# Patient Record
Sex: FEMALE | Race: WHITE | NOT HISPANIC OR LATINO | Employment: OTHER | URBAN - METROPOLITAN AREA
[De-identification: names, ages, dates, MRNs, and addresses within clinical notes are randomized per-mention and may not be internally consistent; named-entity substitution may affect disease eponyms.]

---

## 2017-03-24 ENCOUNTER — APPOINTMENT (OUTPATIENT)
Dept: RADIOLOGY | Facility: MEDICAL CENTER | Age: 62
End: 2017-03-24
Attending: EMERGENCY MEDICINE
Payer: COMMERCIAL

## 2017-03-24 ENCOUNTER — HOSPITAL ENCOUNTER (EMERGENCY)
Facility: MEDICAL CENTER | Age: 62
End: 2017-03-24
Attending: EMERGENCY MEDICINE
Payer: COMMERCIAL

## 2017-03-24 ENCOUNTER — HOSPITAL ENCOUNTER (OUTPATIENT)
Dept: RADIOLOGY | Facility: MEDICAL CENTER | Age: 62
End: 2017-03-24

## 2017-03-24 VITALS
OXYGEN SATURATION: 96 % | TEMPERATURE: 98.6 F | HEART RATE: 72 BPM | HEIGHT: 63 IN | BODY MASS INDEX: 22.15 KG/M2 | SYSTOLIC BLOOD PRESSURE: 148 MMHG | RESPIRATION RATE: 9 BRPM | DIASTOLIC BLOOD PRESSURE: 88 MMHG | WEIGHT: 125 LBS

## 2017-03-24 DIAGNOSIS — R07.89 OTHER CHEST PAIN: ICD-10-CM

## 2017-03-24 LAB
ALBUMIN SERPL BCP-MCNC: 3.9 G/DL (ref 3.2–4.9)
ALBUMIN/GLOB SERPL: 1.4 G/DL
ALP SERPL-CCNC: 71 U/L (ref 30–99)
ALT SERPL-CCNC: 10 U/L (ref 2–50)
ANION GAP SERPL CALC-SCNC: 10 MMOL/L (ref 0–11.9)
AST SERPL-CCNC: 17 U/L (ref 12–45)
BASOPHILS # BLD AUTO: 0.2 % (ref 0–1.8)
BASOPHILS # BLD: 0.02 K/UL (ref 0–0.12)
BILIRUB SERPL-MCNC: 0.8 MG/DL (ref 0.1–1.5)
BUN SERPL-MCNC: 12 MG/DL (ref 8–22)
CALCIUM SERPL-MCNC: 9.1 MG/DL (ref 8.5–10.5)
CHLORIDE SERPL-SCNC: 107 MMOL/L (ref 96–112)
CO2 SERPL-SCNC: 23 MMOL/L (ref 20–33)
CREAT SERPL-MCNC: 0.58 MG/DL (ref 0.5–1.4)
EKG IMPRESSION: NORMAL
EOSINOPHIL # BLD AUTO: 0.04 K/UL (ref 0–0.51)
EOSINOPHIL NFR BLD: 0.4 % (ref 0–6.9)
ERYTHROCYTE [DISTWIDTH] IN BLOOD BY AUTOMATED COUNT: 41.2 FL (ref 35.9–50)
GFR SERPL CREATININE-BSD FRML MDRD: >60 ML/MIN/1.73 M 2
GLOBULIN SER CALC-MCNC: 2.7 G/DL (ref 1.9–3.5)
GLUCOSE SERPL-MCNC: 74 MG/DL (ref 65–99)
HCT VFR BLD AUTO: 40.4 % (ref 37–47)
HGB BLD-MCNC: 14.1 G/DL (ref 12–16)
IMM GRANULOCYTES # BLD AUTO: 0.02 K/UL (ref 0–0.11)
IMM GRANULOCYTES NFR BLD AUTO: 0.2 % (ref 0–0.9)
LYMPHOCYTES # BLD AUTO: 1.38 K/UL (ref 1–4.8)
LYMPHOCYTES NFR BLD: 15.2 % (ref 22–41)
MCH RBC QN AUTO: 32.6 PG (ref 27–33)
MCHC RBC AUTO-ENTMCNC: 34.9 G/DL (ref 33.6–35)
MCV RBC AUTO: 93.5 FL (ref 81.4–97.8)
MONOCYTES # BLD AUTO: 0.6 K/UL (ref 0–0.85)
MONOCYTES NFR BLD AUTO: 6.6 % (ref 0–13.4)
NEUTROPHILS # BLD AUTO: 7.02 K/UL (ref 2–7.15)
NEUTROPHILS NFR BLD: 77.4 % (ref 44–72)
NRBC # BLD AUTO: 0 K/UL
NRBC BLD AUTO-RTO: 0 /100 WBC
PLATELET # BLD AUTO: 276 K/UL (ref 164–446)
PMV BLD AUTO: 9.4 FL (ref 9–12.9)
POTASSIUM SERPL-SCNC: 4 MMOL/L (ref 3.6–5.5)
PROT SERPL-MCNC: 6.6 G/DL (ref 6–8.2)
RBC # BLD AUTO: 4.32 M/UL (ref 4.2–5.4)
SODIUM SERPL-SCNC: 140 MMOL/L (ref 135–145)
TROPONIN I SERPL-MCNC: <0.01 NG/ML (ref 0–0.04)
WBC # BLD AUTO: 9.1 K/UL (ref 4.8–10.8)

## 2017-03-24 PROCEDURE — 80053 COMPREHEN METABOLIC PANEL: CPT

## 2017-03-24 PROCEDURE — 93005 ELECTROCARDIOGRAM TRACING: CPT

## 2017-03-24 PROCEDURE — 99284 EMERGENCY DEPT VISIT MOD MDM: CPT

## 2017-03-24 PROCEDURE — 85025 COMPLETE CBC W/AUTO DIFF WBC: CPT

## 2017-03-24 PROCEDURE — 84484 ASSAY OF TROPONIN QUANT: CPT

## 2017-03-24 ASSESSMENT — PAIN SCALES - GENERAL: PAINLEVEL_OUTOF10: 0

## 2017-03-24 ASSESSMENT — LIFESTYLE VARIABLES: DO YOU DRINK ALCOHOL: NO

## 2017-03-24 NOTE — ED AVS SNAPSHOT
Home Care Instructions                                                                                                                Nury Barrios   MRN: [id number]    Department:  [facility], Emergency Dept   Date of Visit:  3/24/2017            [facility], Emergency Dept    [address] [address]    Phone:  [phone]      You were seen by     Bebeto Eugene M.D.      Your Diagnosis Was     Other chest pain     R07.89       Follow-up Information     1. Schedule an appointment as soon as possible for a visit with Outpatient Anticoagulation Services.    Specialty:  Cardiology    Contact information    [address]  [place] [address] [phone]    Additional information:    From  [address], take the [facility] exit [address]) and head west on [address].  Turn right on [address].  Turn left on [address].  Turn left on [address].  Park in the [address] or [facility] parking garage, or you may use our  parking located at the [address] Entrance.      Medication Information     Review all of your home medications and newly ordered medications with your primary doctor and/or pharmacist as soon as possible. Follow medication instructions as directed by your doctor and/or pharmacist.     Please keep your complete medication list with you and share with your physician. Update the information when medications are discontinued, doses are changed, or new medications (including over-the-counter products) are added; and carry medication information at all times in the event of emergency situations.               Medication List      ASK your doctor about these medications        Instructions    Morning Afternoon Evening Bedtime    multivitamin Tabs        Take 1 Tab by mouth every day.   Dose:  1 Tab                        omeprazole 20 MG delayed-release capsule   Commonly known as:  PRILOSEC        Take 1 Cap by mouth 2 Times a  Day.   Dose:  20 mg                                Procedures and tests performed during your visit     CBC WITH DIFFERENTIAL    COMP METABOLIC PANEL    EKG (NOW)    ESTIMATED GFR    OLD EKG    OUTSIDE IMAGES-DX CHEST    TROPONIN        Discharge Instructions       Nonspecific Chest Pain   Chest pain can be caused by many different conditions. There is always a chance that your pain could be related to something serious, such as a heart attack or a blood clot in your lungs. Chest pain can also be caused by conditions that are not life-threatening. If you have chest pain, it is very important to follow up with your health care provider.  CAUSES   Chest pain can be caused by:  · Heartburn.  · Pneumonia or bronchitis.  · Anxiety or stress.  · Inflammation around your heart (pericarditis) or lung (pleuritis or pleurisy).  · A blood clot in your lung.  · A collapsed lung (pneumothorax). It can develop suddenly on its own (spontaneous pneumothorax) or from trauma to the chest.  · Shingles infection (varicella-zoster virus).  · Heart attack.  · Damage to the bones, muscles, and cartilage that make up your chest wall. This can include:  ¨ Bruised bones due to injury.  ¨ Strained muscles or cartilage due to frequent or repeated coughing or overwork.  ¨ Fracture to one or more ribs.  ¨ Sore cartilage due to inflammation (costochondritis).  RISK FACTORS   Risk factors for chest pain may include:  · Activities that increase your risk for trauma or injury to your chest.  · Respiratory infections or conditions that cause frequent coughing.  · Medical conditions or overeating that can cause heartburn.  · Heart disease or family history of heart disease.  · Conditions or health behaviors that increase your risk of developing a blood clot.  · Having had chicken pox (varicella zoster).  SIGNS AND SYMPTOMS  Chest pain can feel like:  · Burning or tingling on the surface of your chest or deep in your chest.  · Crushing, pressure,  aching, or squeezing pain.  · Dull or sharp pain that is worse when you move, cough, or take a deep breath.  · Pain that is also felt in your back, neck, shoulder, or arm, or pain that spreads to any of these areas.  Your chest pain may come and go, or it may stay constant.  DIAGNOSIS  Lab tests or other studies may be needed to find the cause of your pain. Your health care provider may have you take a test called an ambulatory ECG (electrocardiogram). An ECG records your heartbeat patterns at the time the test is performed. You may also have other tests, such as:  · Transthoracic echocardiogram (TTE). During echocardiography, sound waves are used to create a picture of all of the heart structures and to look at how blood flows through your heart.  · Transesophageal echocardiogram (TRISTAN). This is a more advanced imaging test that obtains images from inside your body. It allows your health care provider to see your heart in finer detail.  · Cardiac monitoring. This allows your health care provider to monitor your heart rate and rhythm in real time.  · Holter monitor. This is a portable device that records your heartbeat and can help to diagnose abnormal heartbeats. It allows your health care provider to track your heart activity for several days, if needed.  · Stress tests. These can be done through exercise or by taking medicine that makes your heart beat more quickly.  · Blood tests.  · Imaging tests.  TREATMENT   Your treatment depends on what is causing your chest pain. Treatment may include:  · Medicines. These may include:  ¨ Acid blockers for heartburn.  ¨ Anti-inflammatory medicine.  ¨ Pain medicine for inflammatory conditions.  ¨ Antibiotic medicine, if an infection is present.  ¨ Medicines to dissolve blood clots.  ¨ Medicines to treat coronary artery disease.  · Supportive care for conditions that do not require medicines. This may include:  ¨ Resting.  ¨ Applying heat or cold packs to injured  areas.  ¨ Limiting activities until pain decreases.  HOME CARE INSTRUCTIONS  · If you were prescribed an antibiotic medicine, finish it all even if you start to feel better.  · Avoid any activities that bring on chest pain.  · Do not use any tobacco products, including cigarettes, chewing tobacco, or electronic cigarettes. If you need help quitting, ask your health care provider.  · Do not drink alcohol.  · Take medicines only as directed by your health care provider.  · Keep all follow-up visits as directed by your health care provider. This is important. This includes any further testing if your chest pain does not go away.  · If heartburn is the cause for your chest pain, you may be told to keep your head raised (elevated) while sleeping. This reduces the chance that acid will go from your stomach into your esophagus.  · Make lifestyle changes as directed by your health care provider. These may include:  ¨ Getting regular exercise. Ask your health care provider to suggest some activities that are safe for you.  ¨ Eating a heart-healthy diet. A registered dietitian can help you to learn healthy eating options.  ¨ Maintaining a healthy weight.  ¨ Managing diabetes, if necessary.  ¨ Reducing stress.  SEEK MEDICAL CARE IF:  · Your chest pain does not go away after treatment.  · You have a rash with blisters on your chest.  · You have a fever.  SEEK IMMEDIATE MEDICAL CARE IF:   · Your chest pain is worse.  · You have an increasing cough, or you cough up blood.  · You have severe abdominal pain.  · You have severe weakness.  · You faint.  · You have chills.  · You have sudden, unexplained chest discomfort.  · You have sudden, unexplained discomfort in your arms, back, neck, or jaw.  · You have shortness of breath at any time.  · You suddenly start to sweat, or your skin gets clammy.  · You feel nauseous or you vomit.  · You suddenly feel light-headed or dizzy.  · Your heart begins to beat quickly, or it feels like it  is skipping beats.  These symptoms may represent a serious problem that is an emergency. Do not wait to see if the symptoms will go away. Get medical help right away. Call your local emergency services (911 in the [place] Do not drive yourself to the hospital.     This information is not intended to replace advice given to you by your health care provider. Make sure you discuss any questions you have with your health care provider.     Document Released: 09/27/2006 Document Revised: 01/08/2016 Document Reviewed: 07/24/2015  [employer] Interactive Patient Education ©2016 [employer]            Patient Information     Patient Information    Following emergency treatment: all patient requiring follow-up care must return either to a private physician or a clinic if your condition worsens before you are able to obtain further medical attention, please return to the emergency room.     Billing Information    At [facility], we work to make the billing process streamlined for our patients.  Our Representatives are here to answer any questions you may have regarding your hospital bill.  If you have insurance coverage and have supplied your insurance information to us, we will submit a claim to your insurer on your behalf.  Should you have any questions regarding your bill, we can be reached online or by phone as follows:  Online: You are able pay your bills online or live chat with our representatives about any billing questions you may have. We are here to help Monday - Friday from 8:00am to 7:30pm and 9:00am - 12:00pm on Saturdays.  Please visit [email or web address]  for more information.   Phone:  [phone] or [phone]    Please note that your emergency physician, surgeon, pathologist, radiologist, anesthesiologist, and other specialists are not employed by [facility] and will therefore bill separately for their services.  Please contact them directly for any questions concerning  their bills at the numbers below:     Emergency Physician Services:  [phone]  [facility]:  [phone]  [facility]:  [phone]  [facility]:  [phone]    1. Your final bill may vary from the amount quoted upon discharge if all procedures are not complete at that time, or if your doctor has additional procedures of which we are not aware. You will receive an additional bill if you return to the Emergency Department at [facility] for suture removal regardless of the facility of which the sutures were placed.     2. Please arrange for settlement of this account at the emergency registration.    3. All self-pay accounts are due in full at the time of treatment.  If you are unable to meet this obligation then payment is expected within 4-5 days.     4. If you have had radiology studies (CT, X-ray, Ultrasound, MRI), you have received a preliminary result during your emergency department visit. Please contact the radiology department [phone] to receive a copy of your final result. Please discuss the Final result with your primary physician or with the follow up physician provided.     Crisis Hotline:  [place] Crisis Hotline:  5-893-RZDBLSC or 1-976.414.9496  [place] Crisis Hotline:    1-301.158.1219 or 975-895-0241         ED Discharge Follow Up Questions    1. In order to provide you with very good care, we would like to follow up with a phone call in the next few days.  May we have your permission to contact you?     YES /  NO    2. What is the best phone number to call you? (       )_____-__________    3. What is the best time to call you?      Morning  /  Afternoon  /  Evening                   Patient Signature:  ____________________________________________________________    Date:  ____________________________________________________________

## 2017-03-24 NOTE — ED AVS SNAPSHOT
3/24/2017          Nury Barrios  [address]  [facility] [address]    Dear Nury:    [facility] wants to ensure your discharge home is safe and you or your loved ones have had all your questions answered regarding your care after you leave the hospital.    You may receive a telephone call within two days of your discharge.  This call is to make certain you understand your discharge instructions as well as ensure we provided you with the best care possible during your stay with us.     The call will only last approximately 3-5 minutes and will be done by a nurse.    Once again, we want to ensure your discharge home is safe and that you have a clear understanding of any next steps in your care.  If you have any questions or concerns, please do not hesitate to contact us, we are here for you.  Thank you for choosing [facility] for your healthcare needs.    Sincerely,    Adeel Goldsmith    [facility] [place] [facility]

## 2017-03-24 NOTE — ED AVS SNAPSHOT
WEMS Access Code: [email or web address]  Expires: 4/23/2017 10:03 PM    Your email address is not on file at [employer].  Email Addresses are required for you to sign up for WEMS, please contact [phone] to verify your personal information and to provide your email address prior to attempting to register for WEMS.    Nury Barrios   [address] [place] [id number]    WEMS  A secure, online tool to manage your health information     [employer]’s WEMS® is a secure, online tool that connects you to your personalized health information from the privacy of your home -- day or night - making it very easy for you to manage your healthcare. Once the activation process is completed, you can even access your medical information using the WEMS letty, which is available for free in the Apple Letty store or Google Play store.     To learn more about WEMS, visit [email or web address]    There are two levels of access available (as shown below):   My Chart Features  [facility] Primary Care Doctor [facility]  Specialists [facility]  Urgent  Care Non-[facility] Primary Care Doctor   Email your healthcare team securely and privately 24/7 X X X    Manage appointments: schedule your next appointment; view details of past/upcoming appointments X      Request prescription refills. X      View recent personal medical records, including lab and immunizations X X X X   View health record, including health history, allergies, medications X X X X   Read reports about your outpatient visits, procedures, consult and ER notes X X X X   See your discharge summary, which is a recap of your hospital and/or ER visit that includes your diagnosis, lab results, and care plan X X  X     How to register for [employer]:  Once your e-mail address has been verified, follow the following steps to sign up for WEMS.     1. Go to  [email or web address]  2. Click on the Sign Up Now box, which takes you to the New Member Sign Up page. You  will need to provide the following information:  a. Enter your [employer] Access Code exactly as it appears at the top of this page. (You will not need to use this code after you’ve completed the sign-up process. If you do not sign up before the expiration date, you must request a new code.)   b. Enter your date of birth.   c. Enter your home email address.   d. Click Submit, and follow the next screen’s instructions.  3. Create a [employer] ID. This will be your [employer] login ID and cannot be changed, so think of one that is secure and easy to remember.  4. Create a [employer] password. You can change your password at any time.  5. Enter your Password Reset Question and Answer. This can be used at a later time if you forget your password.   6. Enter your e-mail address. This allows you to receive e-mail notifications when new information is available in [employer].  7. Click Sign Up. You can now view your health information.    For assistance activating your [employer] account, call [phone]

## 2017-03-25 ENCOUNTER — PATIENT OUTREACH (OUTPATIENT)
Dept: HEALTH INFORMATION MANAGEMENT | Facility: OTHER | Age: 62
End: 2017-03-25

## 2017-03-25 DIAGNOSIS — R07.9 CHEST PAIN, UNSPECIFIED TYPE: ICD-10-CM

## 2017-03-25 NOTE — ED PROVIDER NOTES
ED Provider Note    CHIEF COMPLAINT  Chief Complaint   Patient presents with   • Dizziness   • Tingling     tingling in left arm       HPI  Nury Barrios is a 61 y.o. female who presents for evaluation of dizziness, left arm tingling, and chest pressure. The patient states that her symptoms have been intermittent over the last 4 days. The patient describes the pain as minimal across her chest, however associated with a significant amount of numbness in her left arm beginning today, this morning. The patient describes some mild associated shortness of breath, and secondary to the symptoms presented to an outside emergency Department. There the patient was found to be hypertensive with a systolic in the 200s, and to have slightly depressed sodium of 3. The patient was given some by mouth sodium and was transferred here for further cardiovascular evaluation. The patient denies a history of similar symptoms in the past, and denies history of hypercholesterolemia, diabetes, or hypertension, however the patient reportedly had some abnormalities on EKG, and was therefore transferred here. The patient states that currently after having had her hypertension treated at the outside facility, the patient had improvement of her symptoms overall. She currently denies any chest pain.    REVIEW OF SYSTEMS   Patient denies fever, vision change, sore throat, endorses chest pain, shortness of breath, denies nausea, dysuria, focal muscle pain, rashes, or neurologic deficits     PAST MEDICAL HISTORY       SOCIAL HISTORY  Social History     Social History Main Topics   • Smoking status: Former Smoker     Quit date: 03/24/1991   • Smokeless tobacco: Not on file   • Alcohol Use: Yes      Comment: 1 pint/day   • Drug Use: No   • Sexual Activity: Not on file       SURGICAL HISTORY   has past surgical history that includes knee arthroscopy (1984); gastroscopy with biopsy (9/5/2012); and gastroscopy-endo (3/8/2016).    CURRENT  "MEDICATIONS  Home Medications     Reviewed by Zainab Chavarria R.N. (Registered Nurse) on 03/24/17 at 1909  Med List Status: <None>    Medication Last Dose Status    multivitamin (THERAGRAN) TABS 3/6/2016 Active    omeprazole (PRILOSEC) 20 MG delayed-release capsule  Active                ALLERGIES  No Known Allergies    PHYSICAL EXAM  VITAL SIGNS: /88 mmHg  Pulse 65  Temp(Src) 37 °C (98.6 °F)  Resp 20  Ht 1.6 m (5' 3\")  Wt 56.7 kg (125 lb)  BMI 22.15 kg/m2  SpO2 97%   Pulse ox interpretation: I interpret this pulse ox as normal.  Constitutional: Alert in no apparent distress.  HENT: Head normocephalic, atraumatic, Bilateral external ears normal, Nose normal.  Eyes: Pupils are equal, extraocular movements intact, Conjunctiva normal, Non-icteric.   Neck: Normal range of motion, Supple, No stridor.   Lymphatic: No lymphadenopathy noted.   Cardiovascular: Regular rate and rhythm, no rubs murmurs or gallops   Thorax & Lungs: Lungs clear to auscultation bilaterally, No acute respiratory distress, No wheezing, No increased work of breathing.   Abdomen: Non-distended   Skin: Warm, Dry, No erythema, No rash.   Back: No bony tenderness, No CVA tenderness.   Extremities: Intact distal pulses, No edema, No tenderness, No cyanosis   Musculoskeletal: Good range of motion in all major joints. No tenderness to palpation or major deformities noted.   Neurologic: Alert , Normal motor function, Normal sensory function, No focal deficits noted.   Psychiatric: Affect normal, Judgment normal, Mood normal.       DIAGNOSTIC STUDIES / PROCEDURES    EKG  Normal sinus rhythm, rate of 75, normal intervals, no ST or T-wave changes consistent with ischemia, P-wave inversion in lead V1, possible right atrial enlargement  Gen. impression: Normal EKG    LABS  Labs Reviewed   CBC WITH DIFFERENTIAL - Abnormal; Notable for the following:     Neutrophils-Polys 77.40 (*)     Lymphocytes 15.20 (*)     All other components within normal " limits   COMP METABOLIC PANEL   TROPONIN   ESTIMATED GFR   TROPONIN       RADIOLOGY  OUTSIDE IMAGES-DX CHEST   Preliminary Result          COURSE & MEDICAL DECISION MAKING  Pertinent Labs & Imaging studies reviewed. (See chart for details)    Patient presented here for evaluation of chest pain. The patient was evaluated at outside facility where she was felt to need cardiology evaluation. The patient here however has chest pain that's been intermittent over the past week without associated exertional symptoms, and here has an EKG which is within normal ranges. Additionally the patient has no elevated troponin here or at the outside facility. Given this, according to the patient's HEART score which is 1, the patient has a risk of major adverse cardiac event in the next 6 weeks of less than 2%. I discussed the possibility of admitting the patient with both the patient and her  and daughter at bedside. Specifically regarding the benefits of inpatient provocative testing. The patient however states that she feels very well at this time, and would like to follow-up with primary care on an outpatient basis for provocative testing. The patient will have a outpatient stress test scheduled for her hopefully on Monday. I spoke with case management who came and spoke with the patient regarding obtaining appropriate outpatient follow-up for this testing.    The patient will return for worsening symptoms and is stable at the time of discharge. The patient verbalizes understanding.    The patient is referred to a primary physician for blood pressure management, diabetic screening, and for all other preventative health concerns should they be present.     FINAL IMPRESSION  1. Other, chest pain  2.   3.          Electronically signed by: Bebeto Eugene, 3/24/2017 7:36 PM    This record was made with a voice recognition software. I have tried to correct any grammar, spelling or context errors to the best of my ability, but  errors may still remain. Interpretation of this chart should be taken in this context.

## 2017-03-25 NOTE — DISCHARGE PLANNING
Spoke with pt. Regarding an outpt TM Stress Test. Verified pts phone number, [phone]. Told pt that scheduling would be calling her to schedule this test.

## 2017-03-25 NOTE — ED NOTES
Patient is a transfer brought in by EMS from [facility]. Patient reports feeling dizziness and tingling in L arm earlier today, but has no symptoms currently.

## 2017-03-25 NOTE — DISCHARGE INSTRUCTIONS
Nonspecific Chest Pain   Chest pain can be caused by many different conditions. There is always a chance that your pain could be related to something serious, such as a heart attack or a blood clot in your lungs. Chest pain can also be caused by conditions that are not life-threatening. If you have chest pain, it is very important to follow up with your health care provider.  CAUSES   Chest pain can be caused by:  · Heartburn.  · Pneumonia or bronchitis.  · Anxiety or stress.  · Inflammation around your heart (pericarditis) or lung (pleuritis or pleurisy).  · A blood clot in your lung.  · A collapsed lung (pneumothorax). It can develop suddenly on its own (spontaneous pneumothorax) or from trauma to the chest.  · Shingles infection (varicella-zoster virus).  · Heart attack.  · Damage to the bones, muscles, and cartilage that make up your chest wall. This can include:  ¨ Bruised bones due to injury.  ¨ Strained muscles or cartilage due to frequent or repeated coughing or overwork.  ¨ Fracture to one or more ribs.  ¨ Sore cartilage due to inflammation (costochondritis).  RISK FACTORS   Risk factors for chest pain may include:  · Activities that increase your risk for trauma or injury to your chest.  · Respiratory infections or conditions that cause frequent coughing.  · Medical conditions or overeating that can cause heartburn.  · Heart disease or family history of heart disease.  · Conditions or health behaviors that increase your risk of developing a blood clot.  · Having had chicken pox (varicella zoster).  SIGNS AND SYMPTOMS  Chest pain can feel like:  · Burning or tingling on the surface of your chest or deep in your chest.  · Crushing, pressure, aching, or squeezing pain.  · Dull or sharp pain that is worse when you move, cough, or take a deep breath.  · Pain that is also felt in your back, neck, shoulder, or arm, or pain that spreads to any of these areas.  Your chest pain may come and go, or it may stay  constant.  DIAGNOSIS  Lab tests or other studies may be needed to find the cause of your pain. Your health care provider may have you take a test called an ambulatory ECG (electrocardiogram). An ECG records your heartbeat patterns at the time the test is performed. You may also have other tests, such as:  · Transthoracic echocardiogram (TTE). During echocardiography, sound waves are used to create a picture of all of the heart structures and to look at how blood flows through your heart.  · Transesophageal echocardiogram (TRISTAN). This is a more advanced imaging test that obtains images from inside your body. It allows your health care provider to see your heart in finer detail.  · Cardiac monitoring. This allows your health care provider to monitor your heart rate and rhythm in real time.  · Holter monitor. This is a portable device that records your heartbeat and can help to diagnose abnormal heartbeats. It allows your health care provider to track your heart activity for several days, if needed.  · Stress tests. These can be done through exercise or by taking medicine that makes your heart beat more quickly.  · Blood tests.  · Imaging tests.  TREATMENT   Your treatment depends on what is causing your chest pain. Treatment may include:  · Medicines. These may include:  ¨ Acid blockers for heartburn.  ¨ Anti-inflammatory medicine.  ¨ Pain medicine for inflammatory conditions.  ¨ Antibiotic medicine, if an infection is present.  ¨ Medicines to dissolve blood clots.  ¨ Medicines to treat coronary artery disease.  · Supportive care for conditions that do not require medicines. This may include:  ¨ Resting.  ¨ Applying heat or cold packs to injured areas.  ¨ Limiting activities until pain decreases.  HOME CARE INSTRUCTIONS  · If you were prescribed an antibiotic medicine, finish it all even if you start to feel better.  · Avoid any activities that bring on chest pain.  · Do not use any tobacco products, including  cigarettes, chewing tobacco, or electronic cigarettes. If you need help quitting, ask your health care provider.  · Do not drink alcohol.  · Take medicines only as directed by your health care provider.  · Keep all follow-up visits as directed by your health care provider. This is important. This includes any further testing if your chest pain does not go away.  · If heartburn is the cause for your chest pain, you may be told to keep your head raised (elevated) while sleeping. This reduces the chance that acid will go from your stomach into your esophagus.  · Make lifestyle changes as directed by your health care provider. These may include:  ¨ Getting regular exercise. Ask your health care provider to suggest some activities that are safe for you.  ¨ Eating a heart-healthy diet. A registered dietitian can help you to learn healthy eating options.  ¨ Maintaining a healthy weight.  ¨ Managing diabetes, if necessary.  ¨ Reducing stress.  SEEK MEDICAL CARE IF:  · Your chest pain does not go away after treatment.  · You have a rash with blisters on your chest.  · You have a fever.  SEEK IMMEDIATE MEDICAL CARE IF:   · Your chest pain is worse.  · You have an increasing cough, or you cough up blood.  · You have severe abdominal pain.  · You have severe weakness.  · You faint.  · You have chills.  · You have sudden, unexplained chest discomfort.  · You have sudden, unexplained discomfort in your arms, back, neck, or jaw.  · You have shortness of breath at any time.  · You suddenly start to sweat, or your skin gets clammy.  · You feel nauseous or you vomit.  · You suddenly feel light-headed or dizzy.  · Your heart begins to beat quickly, or it feels like it is skipping beats.  These symptoms may represent a serious problem that is an emergency. Do not wait to see if the symptoms will go away. Get medical help right away. Call your local emergency services (911 in the [place] Do not drive yourself to the hospital.     This  information is not intended to replace advice given to you by your health care provider. Make sure you discuss any questions you have with your health care provider.     Document Released: 09/27/2006 Document Revised: 01/08/2016 Document Reviewed: 07/24/2015  [employer] Interactive Patient Education ©2016 [employer]

## 2017-03-27 ENCOUNTER — HOSPITAL ENCOUNTER (OUTPATIENT)
Dept: RADIOLOGY | Facility: MEDICAL CENTER | Age: 62
End: 2017-03-27
Attending: EMERGENCY MEDICINE
Payer: COMMERCIAL

## 2017-03-27 DIAGNOSIS — R07.9 CHEST PAIN, UNSPECIFIED TYPE: ICD-10-CM

## 2023-01-22 ENCOUNTER — APPOINTMENT (OUTPATIENT)
Dept: RADIOLOGY | Facility: MEDICAL CENTER | Age: 68
End: 2023-01-22
Payer: MEDICARE

## 2023-01-22 ENCOUNTER — HOSPITAL ENCOUNTER (EMERGENCY)
Facility: MEDICAL CENTER | Age: 68
End: 2023-01-22
Attending: EMERGENCY MEDICINE
Payer: MEDICARE

## 2023-01-22 ENCOUNTER — APPOINTMENT (OUTPATIENT)
Dept: RADIOLOGY | Facility: MEDICAL CENTER | Age: 68
End: 2023-01-22
Attending: EMERGENCY MEDICINE
Payer: MEDICARE

## 2023-01-22 VITALS
OXYGEN SATURATION: 94 % | DIASTOLIC BLOOD PRESSURE: 61 MMHG | RESPIRATION RATE: 20 BRPM | TEMPERATURE: 97 F | HEART RATE: 77 BPM | WEIGHT: 169.53 LBS | BODY MASS INDEX: 30.03 KG/M2 | SYSTOLIC BLOOD PRESSURE: 122 MMHG

## 2023-01-22 DIAGNOSIS — R00.2 PALPITATIONS: ICD-10-CM

## 2023-01-22 DIAGNOSIS — E87.1 HYPONATREMIA: ICD-10-CM

## 2023-01-22 LAB
ALBUMIN SERPL BCP-MCNC: 4.6 G/DL (ref 3.2–4.9)
ALBUMIN/GLOB SERPL: 1.5 G/DL
ALP SERPL-CCNC: 111 U/L (ref 30–99)
ALT SERPL-CCNC: 24 U/L (ref 2–50)
ANION GAP SERPL CALC-SCNC: 15 MMOL/L (ref 7–16)
AST SERPL-CCNC: 27 U/L (ref 12–45)
BASOPHILS # BLD AUTO: 0.2 % (ref 0–1.8)
BASOPHILS # BLD: 0.02 K/UL (ref 0–0.12)
BILIRUB SERPL-MCNC: 0.7 MG/DL (ref 0.1–1.5)
BUN SERPL-MCNC: 18 MG/DL (ref 8–22)
CALCIUM ALBUM COR SERPL-MCNC: 9 MG/DL (ref 8.5–10.5)
CALCIUM SERPL-MCNC: 9.5 MG/DL (ref 8.5–10.5)
CHLORIDE SERPL-SCNC: 89 MMOL/L (ref 96–112)
CO2 SERPL-SCNC: 23 MMOL/L (ref 20–33)
CREAT SERPL-MCNC: 0.63 MG/DL (ref 0.5–1.4)
EKG IMPRESSION: NORMAL
EOSINOPHIL # BLD AUTO: 0.06 K/UL (ref 0–0.51)
EOSINOPHIL NFR BLD: 0.5 % (ref 0–6.9)
ERYTHROCYTE [DISTWIDTH] IN BLOOD BY AUTOMATED COUNT: 38.7 FL (ref 35.9–50)
GFR SERPLBLD CREATININE-BSD FMLA CKD-EPI: 96 ML/MIN/1.73 M 2
GLOBULIN SER CALC-MCNC: 3 G/DL (ref 1.9–3.5)
GLUCOSE SERPL-MCNC: 101 MG/DL (ref 65–99)
HCT VFR BLD AUTO: 43.6 % (ref 37–47)
HGB BLD-MCNC: 15.7 G/DL (ref 12–16)
IMM GRANULOCYTES # BLD AUTO: 0.06 K/UL (ref 0–0.11)
IMM GRANULOCYTES NFR BLD AUTO: 0.5 % (ref 0–0.9)
LYMPHOCYTES # BLD AUTO: 1.16 K/UL (ref 1–4.8)
LYMPHOCYTES NFR BLD: 10.6 % (ref 22–41)
MCH RBC QN AUTO: 32.9 PG (ref 27–33)
MCHC RBC AUTO-ENTMCNC: 36 G/DL (ref 33.6–35)
MCV RBC AUTO: 91.4 FL (ref 81.4–97.8)
MONOCYTES # BLD AUTO: 0.54 K/UL (ref 0–0.85)
MONOCYTES NFR BLD AUTO: 4.9 % (ref 0–13.4)
NEUTROPHILS # BLD AUTO: 9.13 K/UL (ref 2–7.15)
NEUTROPHILS NFR BLD: 83.3 % (ref 44–72)
NRBC # BLD AUTO: 0 K/UL
NRBC BLD-RTO: 0 /100 WBC
PLATELET # BLD AUTO: 355 K/UL (ref 164–446)
PMV BLD AUTO: 9.5 FL (ref 9–12.9)
POTASSIUM SERPL-SCNC: 4 MMOL/L (ref 3.6–5.5)
PROT SERPL-MCNC: 7.6 G/DL (ref 6–8.2)
RBC # BLD AUTO: 4.77 M/UL (ref 4.2–5.4)
SODIUM SERPL-SCNC: 127 MMOL/L (ref 135–145)
TROPONIN T SERPL-MCNC: 6 NG/L (ref 6–19)
TROPONIN T SERPL-MCNC: 7 NG/L (ref 6–19)
WBC # BLD AUTO: 11 K/UL (ref 4.8–10.8)

## 2023-01-22 PROCEDURE — 93005 ELECTROCARDIOGRAM TRACING: CPT | Performed by: EMERGENCY MEDICINE

## 2023-01-22 PROCEDURE — 700102 HCHG RX REV CODE 250 W/ 637 OVERRIDE(OP): Performed by: EMERGENCY MEDICINE

## 2023-01-22 PROCEDURE — A9270 NON-COVERED ITEM OR SERVICE: HCPCS | Performed by: EMERGENCY MEDICINE

## 2023-01-22 PROCEDURE — 84484 ASSAY OF TROPONIN QUANT: CPT

## 2023-01-22 PROCEDURE — 85025 COMPLETE CBC W/AUTO DIFF WBC: CPT

## 2023-01-22 PROCEDURE — 700105 HCHG RX REV CODE 258: Performed by: EMERGENCY MEDICINE

## 2023-01-22 PROCEDURE — 99284 EMERGENCY DEPT VISIT MOD MDM: CPT

## 2023-01-22 PROCEDURE — 36415 COLL VENOUS BLD VENIPUNCTURE: CPT

## 2023-01-22 PROCEDURE — 71045 X-RAY EXAM CHEST 1 VIEW: CPT

## 2023-01-22 PROCEDURE — 80053 COMPREHEN METABOLIC PANEL: CPT

## 2023-01-22 PROCEDURE — 93005 ELECTROCARDIOGRAM TRACING: CPT

## 2023-01-22 RX ORDER — SODIUM CHLORIDE 9 MG/ML
1000 INJECTION, SOLUTION INTRAVENOUS ONCE
Status: COMPLETED | OUTPATIENT
Start: 2023-01-22 | End: 2023-01-22

## 2023-01-22 RX ADMIN — METOPROLOL TARTRATE 25 MG: 25 TABLET, FILM COATED ORAL at 09:58

## 2023-01-22 RX ADMIN — SODIUM CHLORIDE 1000 ML: 9 INJECTION, SOLUTION INTRAVENOUS at 10:45

## 2023-01-22 NOTE — ED PROVIDER NOTES
ER Provider Note    Scribed for Rafael Pizarro M.d. by Bebeto Vasquez. 1/22/2023  9:03 AM    Primary Care Provider: Pcp Pt States None    CHIEF COMPLAINT  Chief Complaint   Patient presents with    Chest Pain    Rapid Heart Beat     EXTERNAL RECORDS REVIEWED  Inpatient Notes Patient's last hospitalization was in 2016. She had a GI bleed at that time. She has a history of alcohol use. I am unable to find a previous stress test in our system. No significant findings on EKG.  HPI/ROS  LIMITATION TO HISTORY   Select: : None  OUTSIDE HISTORIAN(S):  Significant other     Nury Barrios is a 67 y.o. female with a history of hypertension who presents to the ED complaining of intermittent heart palpitations onset 3 days ago. She describes her symptoms as episodal. She first felt an episode 3 days ago. She was awoken from sleep feeling that her heart was racing. She wears an Apple iWatch, which recorded her heart rate fluctuating between 116 and 125 beats per minute. She will occasionally feel chest pain with these episodes that radiates to her left arm and shoulder. She denies any chest pain right now. She felt a similar episode last night, which made her want to present here. She denies any shortness of breath. She denies any cardiac history. She takes Losartan and Amlodipine. She consumes 2-3 alcoholic drinks a night.    Pertinent negatives include: chest pain, or shortness of breath. All other systems negative.      PAST MEDICAL HISTORY  Past Medical History:   Diagnosis Date    Hypertension        SURGICAL HISTORY  Past Surgical History:   Procedure Laterality Date    GASTROSCOPY-ENDO  3/8/2016    Procedure: GASTROSCOPY-ENDO;  Surgeon: Flynn Flanagan M.D.;  Location: ENDOSCOPY [facility];  Service:     GASTROSCOPY WITH BIOPSY  9/5/2012    Performed by DONN JJ at ENDOSCOPY [facility]    KNEE ARTHROSCOPY  1984       FAMILY HISTORY  History reviewed. No pertinent family  history.    SOCIAL HISTORY   reports that she quit smoking about 31 years ago. Her smoking use included cigarettes. She does not have any smokeless tobacco history on file. She reports current alcohol use. She reports that she does not use drugs.    CURRENT MEDICATIONS  Previous Medications    MULTIVITAMIN (THERAGRAN) TABS    Take 1 Tab by mouth every day.    OMEPRAZOLE (PRILOSEC) 20 MG DELAYED-RELEASE CAPSULE    Take 1 Cap by mouth 2 Times a Day.       ALLERGIES  Patient has no known allergies.    PHYSICAL EXAM  /69   Pulse 99   Temp 36.6 °C (97.8 °F) (Temporal)   Resp 20   Wt 76.9 kg (169 lb 8.5 oz)   SpO2 97%   BMI 30.03 kg/m²   Constitutional: Well developed, Well nourished, No acute distress, Non-toxic appearance.   HENT: Normocephalic, Atraumatic, Bilateral external ears normal, Oropharynx is clear mucous membranes are moist. No oral exudates or nasal discharge.   Eyes: Pupils are equal round and reactive, EOMI, Conjunctiva normal, No discharge.   Neck: Normal range of motion, No tenderness, Supple, No stridor. No meningismus.  Lymphatic: No lymphadenopathy noted.   Cardiovascular: Regular rate and rhythm without murmur rub or gallop.  Thorax & Lungs: Clear breath sounds bilaterally without wheezes, rhonchi or rales. There is no chest wall tenderness.   Abdomen: Soft non-tender non-distended. There is no rebound or guarding. No organomegaly is appreciated. Bowel sounds are normal.  Skin: Normal without rash.   Back: No CVA or spinal tenderness.   Extremities: Intact distal pulses, No edema, No tenderness, No cyanosis, No clubbing. Capillary refill is less than 2 seconds.  Musculoskeletal: Good range of motion in all major joints. No tenderness to palpation or major deformities noted.   Neurologic: Alert & oriented x 3, Normal motor function, Normal sensory function, No focal deficits noted. Reflexes are normal.  Psychiatric: Affect normal, Judgment normal, Mood normal. There is no suicidal ideation  or patient reported hallucinations.      DIAGNOSTIC STUDIES    Labs:   Labs Reviewed   CBC WITH DIFFERENTIAL - Abnormal; Notable for the following components:       Result Value    WBC 11.0 (*)     MCHC 36.0 (*)     Neutrophils-Polys 83.30 (*)     Lymphocytes 10.60 (*)     Neutrophils (Absolute) 9.13 (*)     All other components within normal limits    Narrative:     Biotin intake of greater than 5 mg per day may interfere with  troponin levels, causing false low values.   COMP METABOLIC PANEL - Abnormal; Notable for the following components:    Sodium 127 (*)     Chloride 89 (*)     Glucose 101 (*)     Alkaline Phosphatase 111 (*)     All other components within normal limits    Narrative:     Biotin intake of greater than 5 mg per day may interfere with  troponin levels, causing false low values.   TROPONIN    Narrative:     Biotin intake of greater than 5 mg per day may interfere with  troponin levels, causing false low values.   TROPONIN    Narrative:     Biotin intake of greater than 5 mg per day may interfere with  troponin levels, causing false low values.   ESTIMATED GFR    Narrative:     Biotin intake of greater than 5 mg per day may interfere with  troponin levels, causing false low values.   CORRECTED CALCIUM    Narrative:     Biotin intake of greater than 5 mg per day may interfere with  troponin levels, causing false low values.        EKG:   I have independently interpreted this EKG  Results for orders placed or performed during the hospital encounter of 23   EKG   Result Value Ref Range    Report       [facility] Emergency Dept.    Test Date:  2023  Pt Name:    TATYANA LUNA            Department: ER  MRN:        [id number]                      Room:       [facility]  Gender:     Female                       Technician: [id number]  :        1955                   Requested By:ER TRIAGE PROTOCOL  Order #:    [id number]                    Christina MD: LIAM NIELSEN,  MD    Measurements  Intervals                                Axis  Rate:       96                           P:          57  AR:         178                          QRS:        -31  QRSD:       92                           T:          53  QT:         362  QTc:        458    Interpretive Statements  Sinus rhythm  LAE, consider biatrial enlargement  Left axis deviation  Anteroseptal infarct, age indeterminate  Compared to ECG 03/24/2017 19:03:01  Left-axis deviation now present  Myocardial infarct finding now present  Possible ischemia no longer present  Electronically Signed On 1- 9:04:59 PST by  LIAM NIELSEN MD           Radiology:   The attending emergency physician has independently interpreted the diagnostic imaging associated with this visit and am waiting the final reading from the radiologist.   DX-CHEST-PORTABLE (1 VIEW)   Final Result      No acute cardiopulmonary abnormality.               COURSE & MEDICAL DECISION MAKING     ED Observation Status? Yes; I am placing the patient in to an observation status due to a diagnostic uncertainty as well as therapeutic intensity. Patient placed in observation status at 9:09 AM, 1/22/2023.     Observation plan is as follows: Patient will undergo cardiac work-up.    Upon Reevaluation, the patient's condition has: Improved; and will be discharged.    Patient discharged from ED Observation status at 11:21 AM 1/22/2023.     INITIAL ASSESSMENT AND PLAN  Care Narrative: Patient presents with palpitations and tachycardia episodically since yesterday.  She says that some of the episodes lasted quite a long time and she has been compliant on her blood pressure medication.    9:05 AM - Patient seen and examined at bedside. Patient will be treated with Lopressor tablet 25 mg. Ordered for DX-Chest, EKG, and labs to evaluate.    EKG reveals no evidence of acute ischemic changes or dysrhythmia at this time    10:23 AM - Patient's labs reveal a low sodium.  Sodium was only  127 with unremarkable electrolytes otherwise with no acidosis and only mild leukocytosis and shift.  I doubt infectious etiology.  She will be treated with NS infusion 1,000 mL.  She does not have a current tachyarrhythmia however low sodium state may be exacerbating what ever tachyarrhythmia she has had historically.  She may benefit from event recorder through cardiology    11:18 AM - I reevaluated the patient at bedside. I updated the patient on her findings. I instructed her to eat more sodium rich foods in her diet. I also informed her to follow up with her cardiologist. She is understandable and agreeable with the plan of care. She was given the opportunity to ask and further questions. She is comfortable with discharge at this time.    ADDITIONAL PROBLEM LIST AND DISPOSITION    I have discussed management of the patient with the following physicians and FLORENTINO's:  None    Discussion of management with other Q or appropriate source(s): None     Escalation of care considered, and ultimately not performed: acute inpatient care management, however at this time, the patient is most appropriate for outpatient management.      Decision tools and prescription drugs considered including, but not limited to: Medication modification consider beta-blocker therapy however we will do dietary modification for sodium improvement .    HYDRATION: Based on the patient's presentation of Other Hyponatremia the patient was given IV fluids. IV Hydration was used because oral hydration was not adequate alone. Upon recheck following hydration, the patient was improved.    Patient will be discharged home.    FOLLOW UP:  [facility]  [address] [facility] [address]  [facility] [id number] [phone]  Schedule an appointment as soon as possible for a visit         FINAL DIANGOSIS  1. Palpitations    2. Hyponatremia        The note accurately reflects work and decisions made by me.  Rafael Pizarro M.D.  1/22/2023  11:45 AM     KENIA  Bebeto Vasquez (Scribe), am scribing for, and in the presence of, Rafael Pizarro M.D..    Electronically signed by: Bebeto Vasquez (Scottieibmarley), 1/22/2023    IRafael M.D. personally performed the services described in this documentation, as scribed by Bebeto Vasquez in my presence, and it is both accurate and complete.

## 2023-01-22 NOTE — ED TRIAGE NOTES
Chief Complaint   Patient presents with    Chest Pain    Rapid Heart Beat     Pt reports that she was woken from sleep Thursday night as well as last night from sleep. Reports that her HR got up to 125 and 116.  Reports that she has ache to left arm and shoulder.  Denies CP at this time.   BP (!) 144/80   Pulse (!) 102   Temp 36.6 °C (97.8 °F) (Temporal)   Resp 18   Wt 76.9 kg (169 lb 8.5 oz)   SpO2 98%   Pt informed of wait times. Educated on triage process.  Asked to return to triage RN for any new or worsening of symptoms. Thanked for patience.

## 2023-01-25 ENCOUNTER — TELEPHONE (OUTPATIENT)
Dept: HEALTH INFORMATION MANAGEMENT | Facility: OTHER | Age: 68
End: 2023-01-25
Payer: MEDICARE

## 2023-01-27 ENCOUNTER — TELEPHONE (OUTPATIENT)
Dept: HEALTH INFORMATION MANAGEMENT | Facility: OTHER | Age: 68
End: 2023-01-27
Payer: MEDICARE

## 2023-05-26 ENCOUNTER — TELEPHONE (OUTPATIENT)
Dept: CARDIOLOGY | Facility: MEDICAL CENTER | Age: 68
End: 2023-05-26
Payer: MEDICARE

## 2023-06-05 ENCOUNTER — OFFICE VISIT (OUTPATIENT)
Dept: CARDIOLOGY | Facility: MEDICAL CENTER | Age: 68
End: 2023-06-05
Attending: EMERGENCY MEDICINE
Payer: MEDICARE

## 2023-06-05 VITALS
OXYGEN SATURATION: 94 % | SYSTOLIC BLOOD PRESSURE: 116 MMHG | HEART RATE: 76 BPM | BODY MASS INDEX: 30.65 KG/M2 | WEIGHT: 173 LBS | RESPIRATION RATE: 14 BRPM | DIASTOLIC BLOOD PRESSURE: 66 MMHG | HEIGHT: 63 IN

## 2023-06-05 DIAGNOSIS — I10 ESSENTIAL HYPERTENSION: ICD-10-CM

## 2023-06-05 DIAGNOSIS — R00.2 PALPITATIONS: ICD-10-CM

## 2023-06-05 DIAGNOSIS — R01.1 MURMUR: ICD-10-CM

## 2023-06-05 PROCEDURE — 99211 OFF/OP EST MAY X REQ PHY/QHP: CPT | Performed by: INTERNAL MEDICINE

## 2023-06-05 PROCEDURE — 3078F DIAST BP <80 MM HG: CPT | Performed by: INTERNAL MEDICINE

## 2023-06-05 PROCEDURE — 3074F SYST BP LT 130 MM HG: CPT | Performed by: INTERNAL MEDICINE

## 2023-06-05 PROCEDURE — 99204 OFFICE O/P NEW MOD 45 MIN: CPT | Performed by: INTERNAL MEDICINE

## 2023-06-05 RX ORDER — LOSARTAN POTASSIUM 50 MG/1
TABLET ORAL
COMMUNITY
Start: 2021-04-01 | End: 2023-06-05 | Stop reason: SDUPTHER

## 2023-06-05 RX ORDER — AMLODIPINE, VALSARTAN AND HYDROCHLOROTHIAZIDE 10; 160; 12.5 MG/1; MG/1; MG/1
TABLET ORAL
COMMUNITY
Start: 2021-04-01 | End: 2023-06-05

## 2023-06-05 RX ORDER — LOSARTAN POTASSIUM 50 MG/1
50 TABLET ORAL 2 TIMES DAILY
Qty: 180 TABLET | Refills: 3 | Status: SHIPPED | OUTPATIENT
Start: 2023-06-05 | End: 2023-12-28 | Stop reason: SDUPTHER

## 2023-06-05 ASSESSMENT — FIBROSIS 4 INDEX: FIB4 SCORE: 1.06

## 2023-06-05 NOTE — PROGRESS NOTES
Chief Complaint   Patient presents with    New Patient     N/P Dx: Palpitations       Subjective     Nruy Barrios is a 67 y.o. female who presents today for initial consultation regarding palpitations.  Seen emergency department for intermittent palpitations noted to be hyponatremic.  Started increasing her salt intake and this resolved her symptoms.  Does not smoke drink excessively or do drugs.  No family history precocious CAD.  Active and feels well.    Past Medical History:   Diagnosis Date    Hypertension      Past Surgical History:   Procedure Laterality Date    GASTROSCOPY-ENDO  3/8/2016    Procedure: GASTROSCOPY-ENDO;  Surgeon: Flynn Flanagan M.D.;  Location: ENDOSCOPY [facility];  Service:     GASTROSCOPY WITH BIOPSY  2012    Performed by DONN JJ at ENDOSCOPY [facility]    KNEE ARTHROSCOPY  1984     History reviewed. No pertinent family history.  Social History     Socioeconomic History    Marital status:      Spouse name: Not on file    Number of children: Not on file    Years of education: Not on file    Highest education level: Not on file   Occupational History    Not on file   Tobacco Use    Smoking status: Former     Types: Cigarettes     Quit date: 3/24/1991     Years since quittin.2    Smokeless tobacco: Not on file   Substance and Sexual Activity    Alcohol use: Yes     Comment: 3 vodka drinks daily    Drug use: No    Sexual activity: Not on file   Other Topics Concern    Not on file   Social History Narrative    Not on file     Social Determinants of Health     Financial Resource Strain: Not on file   Food Insecurity: Not on file   Transportation Needs: Not on file   Physical Activity: Not on file   Stress: Not on file   Social Connections: Not on file   Intimate Partner Violence: Not on file   Housing Stability: Not on file     No Known Allergies  Outpatient Encounter Medications as of 2023   Medication Sig Dispense Refill    losartan  "(COZAAR) 50 MG Tab Take 1 Tablet by mouth 2 times a day. 180 Tablet 3    omeprazole (PRILOSEC) 20 MG delayed-release capsule Take 1 Cap by mouth 2 Times a Day. 60 Cap 2    [DISCONTINUED] losartan (COZAAR) 50 MG Tab       [DISCONTINUED] amLODIPine-Valsartan-HCTZ -12.5 MG Tab       multivitamin (THERAGRAN) TABS Take 1 Tab by mouth every day. 30 Each 3     No facility-administered encounter medications on file as of 6/5/2023.     Review of Systems   All other systems reviewed and are negative.             Objective     /66 (BP Location: Left arm, Patient Position: Sitting, BP Cuff Size: Adult)   Pulse 76   Resp 14   Ht 1.6 m (5' 3\")   Wt 78.5 kg (173 lb)   SpO2 94%   BMI 30.65 kg/m²     Physical Exam  Vitals and nursing note reviewed.   Constitutional:       General: She is not in acute distress.     Appearance: Normal appearance.   HENT:      Head: Normocephalic and atraumatic.      Right Ear: External ear normal.      Left Ear: External ear normal.      Nose: Nose normal.   Eyes:      Conjunctiva/sclera: Conjunctivae normal.   Cardiovascular:      Rate and Rhythm: Normal rate and regular rhythm.      Pulses: Normal pulses.      Heart sounds: Murmur heard.   Pulmonary:      Effort: Pulmonary effort is normal. No respiratory distress.      Breath sounds: Normal breath sounds.   Abdominal:      General: There is no distension.      Palpations: Abdomen is soft.   Musculoskeletal:      Cervical back: No rigidity or tenderness.      Right lower leg: No edema.      Left lower leg: No edema.   Skin:     General: Skin is warm and dry.      Capillary Refill: Capillary refill takes 2 to 3 seconds.   Neurological:      General: No focal deficit present.      Mental Status: She is alert and oriented to person, place, and time.   Psychiatric:         Mood and Affect: Mood normal.         Behavior: Behavior normal.         Thought Content: Thought content normal.       LABS:  No results found for: CHOLSTRLTOT, " LDL, HDL, TRIGLYCERIDE    Lab Results   Component Value Date/Time    WBC 11.0 (H) 01/22/2023 09:10 AM    RBC 4.77 01/22/2023 09:10 AM    HEMOGLOBIN 15.7 01/22/2023 09:10 AM    HEMATOCRIT 43.6 01/22/2023 09:10 AM    MCV 91.4 01/22/2023 09:10 AM    NEUTSPOLYS 83.30 (H) 01/22/2023 09:10 AM    LYMPHOCYTES 10.60 (L) 01/22/2023 09:10 AM    MONOCYTES 4.90 01/22/2023 09:10 AM    EOSINOPHILS 0.50 01/22/2023 09:10 AM    BASOPHILS 0.20 01/22/2023 09:10 AM     Lab Results   Component Value Date/Time    SODIUM 127 (L) 01/22/2023 09:10 AM    POTASSIUM 4.0 01/22/2023 09:10 AM    CHLORIDE 89 (L) 01/22/2023 09:10 AM    CO2 23 01/22/2023 09:10 AM    GLUCOSE 101 (H) 01/22/2023 09:10 AM    BUN 18 01/22/2023 09:10 AM    CREATININE 0.63 01/22/2023 09:10 AM         Lab Results   Component Value Date/Time    ALKPHOSPHAT 111 (H) 01/22/2023 09:10 AM    ASTSGOT 27 01/22/2023 09:10 AM    ALTSGPT 24 01/22/2023 09:10 AM    TBILIRUBIN 0.7 01/22/2023 09:10 AM      No results found for: BNPBTYPENAT   No results found for: TSH  Lab Results   Component Value Date/Time    PROTHROMBTM 12.5 03/07/2016 04:43 PM    INR 0.93 03/07/2016 04:43 PM        EKG reviewed 6/5/2023             Assessment & Plan     1. Palpitations  EC-ECHOCARDIOGRAM COMPLETE W/O CONT      2. Essential hypertension  losartan (COZAAR) 50 MG Tab      3. Murmur  EC-ECHOCARDIOGRAM COMPLETE W/O CONT          Medical Decision Making: Today's Assessment/Status/Plan:          Murmur noted unclear whether this has been adequately addressed in the past.  Recommend echocardiogram.  Having trouble getting her medications filled with her primary midlevel provider.  Also having side effects from amlodipine.  Recommend discontinuing all amlodipine and increasing losartan from 50 mg daily to 50 mg twice daily and monitor her blood pressure.  Palpitations are benign and resolved with improvement of her sodium levels.  We discussed suppressive medications.  If her echocardiogram is normal other than  any valvular disease she can follow-up expectantly.  If she has a recurrence a Zio patch monitor to ensure there is no atrial fibrillation underlying would be warranted.  She will follow-up yearly and as needed.

## 2023-09-29 ENCOUNTER — HOSPITAL ENCOUNTER (OUTPATIENT)
Dept: CARDIOLOGY | Facility: MEDICAL CENTER | Age: 68
End: 2023-09-29
Attending: INTERNAL MEDICINE
Payer: MEDICARE

## 2023-09-29 DIAGNOSIS — R01.1 MURMUR: ICD-10-CM

## 2023-09-29 DIAGNOSIS — R00.2 PALPITATIONS: ICD-10-CM

## 2023-09-29 PROCEDURE — 93306 TTE W/DOPPLER COMPLETE: CPT

## 2023-10-09 LAB
LV EJECT FRACT  99904: 60
LV EJECT FRACT MOD 2C 99903: 75.94
LV EJECT FRACT MOD 4C 99902: 61.12
LV EJECT FRACT MOD BP 99901: 69.94

## 2023-10-09 PROCEDURE — 93306 TTE W/DOPPLER COMPLETE: CPT | Mod: 26 | Performed by: INTERNAL MEDICINE

## 2023-11-08 ENCOUNTER — OFFICE VISIT (OUTPATIENT)
Dept: CARDIOLOGY | Facility: MEDICAL CENTER | Age: 68
End: 2023-11-08
Attending: INTERNAL MEDICINE
Payer: MEDICARE

## 2023-11-08 VITALS
SYSTOLIC BLOOD PRESSURE: 116 MMHG | BODY MASS INDEX: 29.77 KG/M2 | OXYGEN SATURATION: 96 % | WEIGHT: 168 LBS | HEART RATE: 76 BPM | HEIGHT: 63 IN | DIASTOLIC BLOOD PRESSURE: 80 MMHG | RESPIRATION RATE: 16 BRPM

## 2023-11-08 DIAGNOSIS — R03.0 SITUATIONAL HYPERTENSION: ICD-10-CM

## 2023-11-08 DIAGNOSIS — I10 ESSENTIAL HYPERTENSION: ICD-10-CM

## 2023-11-08 DIAGNOSIS — R00.2 PALPITATIONS: ICD-10-CM

## 2023-11-08 PROCEDURE — 3079F DIAST BP 80-89 MM HG: CPT | Performed by: INTERNAL MEDICINE

## 2023-11-08 PROCEDURE — 3074F SYST BP LT 130 MM HG: CPT | Performed by: INTERNAL MEDICINE

## 2023-11-08 PROCEDURE — 99212 OFFICE O/P EST SF 10 MIN: CPT | Performed by: INTERNAL MEDICINE

## 2023-11-08 PROCEDURE — 99213 OFFICE O/P EST LOW 20 MIN: CPT | Performed by: INTERNAL MEDICINE

## 2023-11-08 RX ORDER — METOPROLOL SUCCINATE 25 MG/1
25 TABLET, EXTENDED RELEASE ORAL DAILY
Qty: 90 TABLET | Refills: 3 | Status: SHIPPED | OUTPATIENT
Start: 2023-11-08

## 2023-11-08 ASSESSMENT — FIBROSIS 4 INDEX: FIB4 SCORE: 1.07

## 2023-11-08 NOTE — PROGRESS NOTES
Chief Complaint   Patient presents with    Palpitations     Follow up       Subjective     Nury Barrios is a 68 y.o. female who presents today for initial follow-up regarding palpitations and hypertension.      In the interim her medications have been adjusted and her home blood pressures are now in the 160s over 80s.  She has situational and whitecoat hypertension which exacerbates her blood pressure at times.  Palpitations bother her occasionally and she uses one of her husbands long-acting metoprolol as which helps her situation.    Past Medical History:   Diagnosis Date    Hypertension      Past Surgical History:   Procedure Laterality Date    GASTROSCOPY-ENDO  3/8/2016    Procedure: GASTROSCOPY-ENDO;  Surgeon: Flynn Flanagan M.D.;  Location: ENDOSCOPY [facility];  Service:     GASTROSCOPY WITH BIOPSY  2012    Performed by DONN JJ at ENDOSCOPY [facility]    KNEE ARTHROSCOPY  1984     History reviewed. No pertinent family history.  Social History     Socioeconomic History    Marital status:      Spouse name: Not on file    Number of children: Not on file    Years of education: Not on file    Highest education level: Not on file   Occupational History    Not on file   Tobacco Use    Smoking status: Former     Current packs/day: 0.00     Types: Cigarettes     Quit date: 3/24/1991     Years since quittin.6    Smokeless tobacco: Not on file   Substance and Sexual Activity    Alcohol use: Yes     Comment: 3 vodka drinks daily    Drug use: No    Sexual activity: Not on file   Other Topics Concern    Not on file   Social History Narrative    Not on file     Social Determinants of Health     Financial Resource Strain: Not on file   Food Insecurity: Not on file   Transportation Needs: Not on file   Physical Activity: Not on file   Stress: Not on file   Social Connections: Not on file   Intimate Partner Violence: Not on file   Housing Stability: Not on file     No Known  "Allergies  Outpatient Encounter Medications as of 11/8/2023   Medication Sig Dispense Refill    Calcium-Magnesium-Zinc (MILTON-MAG-ZINC PO) Take  by mouth.      VITAMIN D, CHOLECALCIFEROL, PO Take  by mouth.      Ascorbic Acid (VITAMIN C ER PO) Take  by mouth.      metoprolol SR (TOPROL XL) 25 MG TABLET SR 24 HR Take 1 Tablet by mouth every day. 90 Tablet 3    losartan (COZAAR) 50 MG Tab Take 1 Tablet by mouth 2 times a day. 180 Tablet 3    omeprazole (PRILOSEC) 20 MG delayed-release capsule Take 1 Cap by mouth 2 Times a Day. 60 Cap 2    multivitamin (THERAGRAN) TABS Take 1 Tab by mouth every day. 30 Each 3     No facility-administered encounter medications on file as of 11/8/2023.     Review of Systems   All other systems reviewed and are negative.             Objective     /80 (BP Location: Left arm, Patient Position: Sitting)   Pulse 76   Resp 16   Ht 1.6 m (5' 3\")   Wt 76.2 kg (168 lb)   SpO2 96%   BMI 29.76 kg/m²     Physical Exam  Vitals and nursing note reviewed.   Constitutional:       General: She is not in acute distress.     Appearance: Normal appearance.   HENT:      Head: Normocephalic and atraumatic.      Right Ear: External ear normal.      Left Ear: External ear normal.      Nose: Nose normal.   Eyes:      Conjunctiva/sclera: Conjunctivae normal.   Cardiovascular:      Rate and Rhythm: Normal rate and regular rhythm.      Pulses: Normal pulses.      Heart sounds: Murmur heard.   Pulmonary:      Effort: Pulmonary effort is normal. No respiratory distress.      Breath sounds: Normal breath sounds.   Abdominal:      General: There is no distension.      Palpations: Abdomen is soft.   Musculoskeletal:      Cervical back: No rigidity or tenderness.      Right lower leg: No edema.      Left lower leg: No edema.   Skin:     General: Skin is warm and dry.      Capillary Refill: Capillary refill takes 2 to 3 seconds.   Neurological:      General: No focal deficit present.      Mental Status: She is " "alert and oriented to person, place, and time.   Psychiatric:         Mood and Affect: Mood normal.         Behavior: Behavior normal.         Thought Content: Thought content normal.       LABS:  No results found for: \"CHOLSTRLTOT\", \"LDL\", \"HDL\", \"TRIGLYCERIDE\"    Lab Results   Component Value Date/Time    WBC 11.0 (H) 01/22/2023 09:10 AM    RBC 4.77 01/22/2023 09:10 AM    HEMOGLOBIN 15.7 01/22/2023 09:10 AM    HEMATOCRIT 43.6 01/22/2023 09:10 AM    MCV 91.4 01/22/2023 09:10 AM    NEUTSPOLYS 83.30 (H) 01/22/2023 09:10 AM    LYMPHOCYTES 10.60 (L) 01/22/2023 09:10 AM    MONOCYTES 4.90 01/22/2023 09:10 AM    EOSINOPHILS 0.50 01/22/2023 09:10 AM    BASOPHILS 0.20 01/22/2023 09:10 AM     Lab Results   Component Value Date/Time    SODIUM 127 (L) 01/22/2023 09:10 AM    POTASSIUM 4.0 01/22/2023 09:10 AM    CHLORIDE 89 (L) 01/22/2023 09:10 AM    CO2 23 01/22/2023 09:10 AM    GLUCOSE 101 (H) 01/22/2023 09:10 AM    BUN 18 01/22/2023 09:10 AM    CREATININE 0.63 01/22/2023 09:10 AM         Lab Results   Component Value Date/Time    ALKPHOSPHAT 111 (H) 01/22/2023 09:10 AM    ASTSGOT 27 01/22/2023 09:10 AM    ALTSGPT 24 01/22/2023 09:10 AM    TBILIRUBIN 0.7 01/22/2023 09:10 AM      No results found for: \"BNPBTYPENAT\"   No results found for: \"TSH\"  Lab Results   Component Value Date/Time    PROTHROMBTM 12.5 03/07/2016 04:43 PM    INR 0.93 03/07/2016 04:43 PM        EKG reviewed 6/5/2023             Assessment & Plan     1. Palpitations  metoprolol SR (TOPROL XL) 25 MG TABLET SR 24 HR      2. Essential hypertension  metoprolol SR (TOPROL XL) 25 MG TABLET SR 24 HR      3. Situational hypertension            Medical Decision Making: Today's Assessment/Status/Plan:          Murmur due to aortic sclerosis no other abnormalities on echocardiogram.  Situational hypertension would be assisted by further medical therapy but her average home blood pressures per her report are good.  Also the Toprol XL does help her palpitations.  Recommend " Toprol-XL daily added to her current regimen, she will consider and at least take it as needed for palpitations.  She will follow-up regularly in 6 to 12 months.

## 2023-12-28 ENCOUNTER — TELEPHONE (OUTPATIENT)
Dept: CARDIOLOGY | Facility: MEDICAL CENTER | Age: 68
End: 2023-12-28
Payer: MEDICARE

## 2023-12-28 DIAGNOSIS — I10 ESSENTIAL HYPERTENSION: ICD-10-CM

## 2023-12-28 RX ORDER — LOSARTAN POTASSIUM 50 MG/1
50 TABLET ORAL 2 TIMES DAILY
Qty: 180 TABLET | Refills: 0 | Status: SHIPPED | OUTPATIENT
Start: 2023-12-28

## 2023-12-28 NOTE — TELEPHONE ENCOUNTER
Received request via: Patient    Was the patient seen in the last year in this department? Yes    Does the patient have an active prescription (recently filled or refills available) for medication(s) requested?  5 DAYS    Does the patient have [facility] Plus and need 100 day supply (blood pressure, diabetes and cholesterol meds only)? Patient does not have SCP

## 2024-05-03 ENCOUNTER — APPOINTMENT (OUTPATIENT)
Dept: CARDIOLOGY | Facility: MEDICAL CENTER | Age: 69
End: 2024-05-03
Attending: INTERNAL MEDICINE
Payer: MEDICARE

## 2024-05-07 DIAGNOSIS — I10 ESSENTIAL HYPERTENSION: ICD-10-CM

## 2024-05-07 RX ORDER — LOSARTAN POTASSIUM 50 MG/1
50 TABLET ORAL 2 TIMES DAILY
Qty: 180 TABLET | Refills: 0 | Status: SHIPPED | OUTPATIENT
Start: 2024-05-07

## 2024-08-07 DIAGNOSIS — I10 ESSENTIAL HYPERTENSION: ICD-10-CM

## 2024-08-07 RX ORDER — LOSARTAN POTASSIUM 50 MG/1
50 TABLET ORAL 2 TIMES DAILY
Qty: 180 TABLET | Refills: 3 | Status: SHIPPED | OUTPATIENT
Start: 2024-08-07

## 2024-08-07 NOTE — TELEPHONE ENCOUNTER
Is the patient due for a refill? Yes    Was the patient seen the past year? Yes    Date of last office visit: 11/8/23    Does the patient have an upcoming appointment?  No    Provider to refill:TW    Does the patients insurance require a 100 day supply?  No

## 2024-11-16 NOTE — TELEPHONE ENCOUNTER
Spoke to patient in regards to records for NP appointment with Dr. LOWRY. Patient has been treated by a cardiologist at the [facility] by , all recent records in epic including blood work, cardiac testing, and EKG. Confirmed appt date, time and location.     Patient/Caregiver provided printed discharge information.

## 2024-11-17 DIAGNOSIS — I10 ESSENTIAL HYPERTENSION: ICD-10-CM

## 2024-11-17 DIAGNOSIS — R00.2 PALPITATIONS: ICD-10-CM

## 2024-11-18 NOTE — TELEPHONE ENCOUNTER
Is the patient due for a refill? Yes    Was the patient seen the past year? No    Date of last office visit: 11.08.2023    Does the patient have an upcoming appointment?  No     Provider to refill: TW    Does the patient have [facility] Plus and need 100-day supply? (This applies to ALL medications) Patient does not have SCP

## 2024-11-26 RX ORDER — METOPROLOL SUCCINATE 25 MG/1
25 TABLET, EXTENDED RELEASE ORAL DAILY
Qty: 90 TABLET | Refills: 0 | Status: SHIPPED | OUTPATIENT
Start: 2024-11-26

## 2025-02-12 ENCOUNTER — HOSPITAL ENCOUNTER (OUTPATIENT)
Facility: MEDICAL CENTER | Age: 70
End: 2025-02-12
Attending: STUDENT IN AN ORGANIZED HEALTH CARE EDUCATION/TRAINING PROGRAM | Admitting: STUDENT IN AN ORGANIZED HEALTH CARE EDUCATION/TRAINING PROGRAM
Payer: MEDICARE

## 2025-02-12 ENCOUNTER — APPOINTMENT (OUTPATIENT)
Dept: RADIOLOGY | Facility: MEDICAL CENTER | Age: 70
End: 2025-02-12
Attending: STUDENT IN AN ORGANIZED HEALTH CARE EDUCATION/TRAINING PROGRAM
Payer: MEDICARE

## 2025-02-12 VITALS
RESPIRATION RATE: 16 BRPM | OXYGEN SATURATION: 90 % | WEIGHT: 174.16 LBS | BODY MASS INDEX: 30.86 KG/M2 | DIASTOLIC BLOOD PRESSURE: 70 MMHG | HEIGHT: 63 IN | TEMPERATURE: 97.4 F | HEART RATE: 51 BPM | SYSTOLIC BLOOD PRESSURE: 154 MMHG

## 2025-02-12 DIAGNOSIS — I10 PRIMARY HYPERTENSION: ICD-10-CM

## 2025-02-12 PROBLEM — K21.9 GASTROESOPHAGEAL REFLUX DISEASE WITHOUT ESOPHAGITIS: Status: ACTIVE | Noted: 2025-02-12

## 2025-02-12 PROBLEM — R07.2 SUBSTERNAL CHEST PAIN: Status: ACTIVE | Noted: 2025-02-12

## 2025-02-12 PROBLEM — R07.2 SUBSTERNAL CHEST PAIN: Status: RESOLVED | Noted: 2025-02-12 | Resolved: 2025-02-12

## 2025-02-12 LAB
ANION GAP SERPL CALC-SCNC: 11 MMOL/L (ref 7–16)
ANION GAP SERPL CALC-SCNC: 12 MMOL/L (ref 7–16)
BUN SERPL-MCNC: 6 MG/DL (ref 8–22)
BUN SERPL-MCNC: 6 MG/DL (ref 8–22)
CALCIUM SERPL-MCNC: 9 MG/DL (ref 8.5–10.5)
CALCIUM SERPL-MCNC: 9.1 MG/DL (ref 8.5–10.5)
CHLORIDE SERPL-SCNC: 103 MMOL/L (ref 96–112)
CHLORIDE SERPL-SCNC: 103 MMOL/L (ref 96–112)
CO2 SERPL-SCNC: 24 MMOL/L (ref 20–33)
CO2 SERPL-SCNC: 26 MMOL/L (ref 20–33)
CREAT SERPL-MCNC: 0.69 MG/DL (ref 0.5–1.4)
CREAT SERPL-MCNC: 0.75 MG/DL (ref 0.5–1.4)
EKG IMPRESSION: NORMAL
ERYTHROCYTE [DISTWIDTH] IN BLOOD BY AUTOMATED COUNT: 41.4 FL (ref 35.9–50)
GFR SERPLBLD CREATININE-BSD FMLA CKD-EPI: 85 ML/MIN/1.73 M 2
GFR SERPLBLD CREATININE-BSD FMLA CKD-EPI: 93 ML/MIN/1.73 M 2
GLUCOSE SERPL-MCNC: 102 MG/DL (ref 65–99)
GLUCOSE SERPL-MCNC: 112 MG/DL (ref 65–99)
HCT VFR BLD AUTO: 42.5 % (ref 37–47)
HGB BLD-MCNC: 15.2 G/DL (ref 12–16)
MCH RBC QN AUTO: 34.4 PG (ref 27–33)
MCHC RBC AUTO-ENTMCNC: 35.8 G/DL (ref 32.2–35.5)
MCV RBC AUTO: 96.2 FL (ref 81.4–97.8)
PLATELET # BLD AUTO: 324 K/UL (ref 164–446)
PMV BLD AUTO: 9.6 FL (ref 9–12.9)
POTASSIUM SERPL-SCNC: 3.7 MMOL/L (ref 3.6–5.5)
POTASSIUM SERPL-SCNC: 3.8 MMOL/L (ref 3.6–5.5)
RBC # BLD AUTO: 4.42 M/UL (ref 4.2–5.4)
SODIUM SERPL-SCNC: 139 MMOL/L (ref 135–145)
SODIUM SERPL-SCNC: 140 MMOL/L (ref 135–145)
TROPONIN T SERPL-MCNC: <6 NG/L (ref 6–19)
TROPONIN T SERPL-MCNC: <6 NG/L (ref 6–19)
WBC # BLD AUTO: 10.5 K/UL (ref 4.8–10.8)

## 2025-02-12 PROCEDURE — 93010 ELECTROCARDIOGRAM REPORT: CPT | Performed by: INTERNAL MEDICINE

## 2025-02-12 PROCEDURE — 93005 ELECTROCARDIOGRAM TRACING: CPT | Mod: TC | Performed by: STUDENT IN AN ORGANIZED HEALTH CARE EDUCATION/TRAINING PROGRAM

## 2025-02-12 PROCEDURE — 84484 ASSAY OF TROPONIN QUANT: CPT

## 2025-02-12 PROCEDURE — 96374 THER/PROPH/DIAG INJ IV PUSH: CPT | Mod: XU

## 2025-02-12 PROCEDURE — A9270 NON-COVERED ITEM OR SERVICE: HCPCS | Performed by: STUDENT IN AN ORGANIZED HEALTH CARE EDUCATION/TRAINING PROGRAM

## 2025-02-12 PROCEDURE — 85027 COMPLETE CBC AUTOMATED: CPT

## 2025-02-12 PROCEDURE — 700111 HCHG RX REV CODE 636 W/ 250 OVERRIDE (IP)

## 2025-02-12 PROCEDURE — A9502 TC99M TETROFOSMIN: HCPCS

## 2025-02-12 PROCEDURE — 700102 HCHG RX REV CODE 250 W/ 637 OVERRIDE(OP): Performed by: STUDENT IN AN ORGANIZED HEALTH CARE EDUCATION/TRAINING PROGRAM

## 2025-02-12 PROCEDURE — 80048 BASIC METABOLIC PNL TOTAL CA: CPT

## 2025-02-12 PROCEDURE — G0378 HOSPITAL OBSERVATION PER HR: HCPCS

## 2025-02-12 PROCEDURE — 99223 1ST HOSP IP/OBS HIGH 75: CPT | Performed by: STUDENT IN AN ORGANIZED HEALTH CARE EDUCATION/TRAINING PROGRAM

## 2025-02-12 RX ORDER — ACETAMINOPHEN 325 MG/1
650 TABLET ORAL EVERY 6 HOURS PRN
Status: DISCONTINUED | OUTPATIENT
Start: 2025-02-12 | End: 2025-02-12 | Stop reason: HOSPADM

## 2025-02-12 RX ORDER — REGADENOSON 0.08 MG/ML
INJECTION, SOLUTION INTRAVENOUS
Status: COMPLETED
Start: 2025-02-12 | End: 2025-02-12

## 2025-02-12 RX ORDER — OMEPRAZOLE 20 MG/1
20 CAPSULE, DELAYED RELEASE ORAL 2 TIMES DAILY
Status: DISCONTINUED | OUTPATIENT
Start: 2025-02-12 | End: 2025-02-12 | Stop reason: HOSPADM

## 2025-02-12 RX ORDER — METOPROLOL SUCCINATE 25 MG/1
25 TABLET, EXTENDED RELEASE ORAL DAILY
Status: DISCONTINUED | OUTPATIENT
Start: 2025-02-12 | End: 2025-02-12 | Stop reason: HOSPADM

## 2025-02-12 RX ORDER — HYDRALAZINE HYDROCHLORIDE 20 MG/ML
10 INJECTION INTRAMUSCULAR; INTRAVENOUS EVERY 4 HOURS PRN
Status: DISCONTINUED | OUTPATIENT
Start: 2025-02-12 | End: 2025-02-12 | Stop reason: HOSPADM

## 2025-02-12 RX ORDER — LABETALOL HYDROCHLORIDE 5 MG/ML
10 INJECTION, SOLUTION INTRAVENOUS EVERY 4 HOURS PRN
Status: DISCONTINUED | OUTPATIENT
Start: 2025-02-12 | End: 2025-02-12 | Stop reason: HOSPADM

## 2025-02-12 RX ORDER — AMINOPHYLLINE 25 MG/ML
100 INJECTION, SOLUTION INTRAVENOUS
Status: DISCONTINUED | OUTPATIENT
Start: 2025-02-12 | End: 2025-02-12 | Stop reason: HOSPADM

## 2025-02-12 RX ORDER — HYDRALAZINE HYDROCHLORIDE 10 MG/1
10 TABLET, FILM COATED ORAL 3 TIMES DAILY
Qty: 90 TABLET | Refills: 0 | Status: SHIPPED | OUTPATIENT
Start: 2025-02-12

## 2025-02-12 RX ORDER — LOSARTAN POTASSIUM 50 MG/1
50 TABLET ORAL 2 TIMES DAILY
Status: DISCONTINUED | OUTPATIENT
Start: 2025-02-12 | End: 2025-02-12 | Stop reason: HOSPADM

## 2025-02-12 RX ORDER — REGADENOSON 0.08 MG/ML
0.4 INJECTION, SOLUTION INTRAVENOUS ONCE
Status: COMPLETED | OUTPATIENT
Start: 2025-02-12 | End: 2025-02-12

## 2025-02-12 RX ORDER — ENOXAPARIN SODIUM 100 MG/ML
40 INJECTION SUBCUTANEOUS DAILY
Status: DISCONTINUED | OUTPATIENT
Start: 2025-02-12 | End: 2025-02-12 | Stop reason: HOSPADM

## 2025-02-12 RX ADMIN — METOPROLOL SUCCINATE 25 MG: 25 TABLET, EXTENDED RELEASE ORAL at 08:59

## 2025-02-12 RX ADMIN — LOSARTAN POTASSIUM 50 MG: 50 TABLET, FILM COATED ORAL at 08:58

## 2025-02-12 RX ADMIN — REGADENOSON 0.4 MG: 0.08 INJECTION, SOLUTION INTRAVENOUS at 11:23

## 2025-02-12 RX ADMIN — OMEPRAZOLE 20 MG: 20 CAPSULE, DELAYED RELEASE ORAL at 08:58

## 2025-02-12 SDOH — ECONOMIC STABILITY: TRANSPORTATION INSECURITY
IN THE PAST 12 MONTHS, HAS THE LACK OF TRANSPORTATION KEPT YOU FROM MEDICAL APPOINTMENTS OR FROM GETTING MEDICATIONS?: NO

## 2025-02-12 SDOH — ECONOMIC STABILITY: TRANSPORTATION INSECURITY
IN THE PAST 12 MONTHS, HAS LACK OF RELIABLE TRANSPORTATION KEPT YOU FROM MEDICAL APPOINTMENTS, MEETINGS, WORK OR FROM GETTING THINGS NEEDED FOR DAILY LIVING?: NO

## 2025-02-12 ASSESSMENT — COGNITIVE AND FUNCTIONAL STATUS - GENERAL
DAILY ACTIVITIY SCORE: 24
SUGGESTED CMS G CODE MODIFIER DAILY ACTIVITY: CH
MOBILITY SCORE: 24
SUGGESTED CMS G CODE MODIFIER MOBILITY: CH

## 2025-02-12 ASSESSMENT — ENCOUNTER SYMPTOMS
VOMITING: 0
NAUSEA: 0
DIARRHEA: 0
FEVER: 0
COUGH: 0
ABDOMINAL PAIN: 0
SHORTNESS OF BREATH: 0
CHILLS: 0

## 2025-02-12 ASSESSMENT — LIFESTYLE VARIABLES
TOTAL SCORE: 0
DOES PATIENT WANT TO STOP DRINKING: NO
HAVE YOU EVER FELT YOU SHOULD CUT DOWN ON YOUR DRINKING: NO
HOW MANY TIMES IN THE PAST YEAR HAVE YOU HAD 5 OR MORE DRINKS IN A DAY: 0
TOTAL SCORE: 0
AVERAGE NUMBER OF DAYS PER WEEK YOU HAVE A DRINK CONTAINING ALCOHOL: 7
EVER HAD A DRINK FIRST THING IN THE MORNING TO STEADY YOUR NERVES TO GET RID OF A HANGOVER: NO
ALCOHOL_USE: YES
ON A TYPICAL DAY WHEN YOU DRINK ALCOHOL HOW MANY DRINKS DO YOU HAVE: 2
CONSUMPTION TOTAL: POSITIVE
TOTAL SCORE: 0
EVER FELT BAD OR GUILTY ABOUT YOUR DRINKING: NO
HAVE PEOPLE ANNOYED YOU BY CRITICIZING YOUR DRINKING: NO

## 2025-02-12 ASSESSMENT — SOCIAL DETERMINANTS OF HEALTH (SDOH)
WITHIN THE LAST YEAR, HAVE YOU BEEN HUMILIATED OR EMOTIONALLY ABUSED IN OTHER WAYS BY YOUR PARTNER OR EX-PARTNER?: NO
WITHIN THE PAST 12 MONTHS, THE FOOD YOU BOUGHT JUST DIDN'T LAST AND YOU DIDN'T HAVE MONEY TO GET MORE: NEVER TRUE
WITHIN THE LAST YEAR, HAVE YOU BEEN KICKED, HIT, SLAPPED, OR OTHERWISE PHYSICALLY HURT BY YOUR PARTNER OR EX-PARTNER?: NO
IN THE PAST 12 MONTHS, HAS THE ELECTRIC, GAS, OIL, OR WATER COMPANY THREATENED TO SHUT OFF SERVICE IN YOUR HOME?: NO
WITHIN THE LAST YEAR, HAVE YOU BEEN AFRAID OF YOUR PARTNER OR EX-PARTNER?: NO
WITHIN THE PAST 12 MONTHS, YOU WORRIED THAT YOUR FOOD WOULD RUN OUT BEFORE YOU GOT THE MONEY TO BUY MORE: NEVER TRUE
WITHIN THE LAST YEAR, HAVE TO BEEN RAPED OR FORCED TO HAVE ANY KIND OF SEXUAL ACTIVITY BY YOUR PARTNER OR EX-PARTNER?: NO

## 2025-02-12 ASSESSMENT — PAIN DESCRIPTION - PAIN TYPE
TYPE: ACUTE PAIN
TYPE: ACUTE PAIN

## 2025-02-12 ASSESSMENT — PATIENT HEALTH QUESTIONNAIRE - PHQ9
SUM OF ALL RESPONSES TO PHQ9 QUESTIONS 1 AND 2: 0
1. LITTLE INTEREST OR PLEASURE IN DOING THINGS: NOT AT ALL
2. FEELING DOWN, DEPRESSED, IRRITABLE, OR HOPELESS: NOT AT ALL

## 2025-02-12 NOTE — H&P
Hospital Medicine History & Physical Note    Date of Service  2/12/2025    Primary Care Physician  ISRAEL Warner    Code Status  Full Code    Chief Complaint  Chest pain    History of Presenting Illness  Nury Barrios is a 69 y.o. female who presented 2/12/2025 with chest pain.  PMH of hypertension, GERD.  She is complaining of chest pain in the upper abdomen/lower chest region radiating to the left side of the chest.  She has had previous symptoms in the past which were mild but never this bad before.  No history of coronary artery disease, stroke.  She has a history of hypertension and believes her blood pressure is less controlled, she does not really measure at home well without much.     On arrival afebrile.  BP significantly elevated with systolic in the 190s.  Troponin negative, EKG from outside facility unremarkable.    I discussed the plan of care with patient, family, and bedside RN.    Review of Systems  Review of Systems   Constitutional:  Negative for chills and fever.   Respiratory:  Negative for cough and shortness of breath.    Cardiovascular:  Positive for chest pain.   Gastrointestinal:  Negative for abdominal pain, diarrhea, nausea and vomiting.   Genitourinary:  Negative for dysuria and urgency.       Past Medical History   has a past medical history of Hypertension.    Surgical History   has a past surgical history that includes knee arthroscopy (1984); gastroscopy with biopsy (9/5/2012); and gastroscopy-endo (3/8/2016).     Family History  Family history reviewed with patient. There is no family history that is pertinent to the chief complaint.     Social History   reports that she quit smoking about 33 years ago. Her smoking use included cigarettes. She does not have any smokeless tobacco history on file. She reports current alcohol use. She reports that she does not use drugs.    Allergies  No Known Allergies    Medications  Prior to Admission Medications   Prescriptions  Last Dose Informant Patient Reported? Taking?   Ascorbic Acid (VITAMIN C ER PO)   Yes No   Sig: Take  by mouth.   Calcium-Magnesium-Zinc (MILTON-MAG-ZINC PO)   Yes No   Sig: Take  by mouth.   VITAMIN D, CHOLECALCIFEROL, PO   Yes No   Sig: Take  by mouth.   losartan (COZAAR) 50 MG Tab   No No   Sig: Take 1 Tablet by mouth 2 times a day.   metoprolol SR (TOPROL XL) 25 MG TABLET SR 24 HR   No No   Sig: TAKE 1 TABLET BY MOUTH EVERY DAY   multivitamin (THERAGRAN) TABS  Patient No No   Sig: Take 1 Tab by mouth every day.   omeprazole (PRILOSEC) 20 MG delayed-release capsule   No No   Sig: Take 1 Cap by mouth 2 Times a Day.      Facility-Administered Medications: None       Physical Exam  Temp:  [36.1 °C (97 °F)-36.9 °C (98.5 °F)] 36.1 °C (97 °F)  Pulse:  [54-66] 54  Resp:  [16-18] 16  BP: (148-187)/(71-86) 172/75  SpO2:  [90 %-91 %] 90 %  Blood Pressure : (!) 148/71 (RN notified)   Temperature: 36.7 °C (98 °F)   Pulse: 61   Respiration: 18   Pulse Oximetry: 91 %       Physical Exam  Constitutional:       Appearance: Normal appearance.   HENT:      Head: Normocephalic and atraumatic.      Mouth/Throat:      Mouth: Mucous membranes are moist.      Pharynx: No oropharyngeal exudate or posterior oropharyngeal erythema.   Cardiovascular:      Rate and Rhythm: Normal rate and regular rhythm.      Pulses: Normal pulses.      Heart sounds: Normal heart sounds. No murmur heard.  Pulmonary:      Effort: Pulmonary effort is normal. No respiratory distress.      Breath sounds: Normal breath sounds.   Musculoskeletal:         General: No swelling or tenderness. Normal range of motion.   Skin:     General: Skin is warm and dry.   Neurological:      General: No focal deficit present.      Mental Status: She is alert and oriented to person, place, and time.   Psychiatric:         Mood and Affect: Mood normal.         Laboratory:  Recent Labs     02/12/25  0205   WBC 10.5   RBC 4.42   HEMOGLOBIN 15.2   HEMATOCRIT 42.5   MCV 96.2   MCH 34.4*  "  MCHC 35.8*   RDW 41.4   PLATELETCT 324   MPV 9.6         No results for input(s): \"ALTSGPT\", \"ASTSGOT\", \"ALKPHOSPHAT\", \"TBILIRUBIN\", \"DBILIRUBIN\", \"GAMMAGT\", \"AMYLASE\", \"LIPASE\", \"ALB\", \"PREALBUMIN\", \"GLUCOSE\" in the last 72 hours.      No results for input(s): \"NTPROBNP\" in the last 72 hours.      No results for input(s): \"TROPONINT\" in the last 72 hours.    Imaging:  NM-CARDIAC STRESS TEST    (Results Pending)       EKG:  I have personally reviewed the images and compared with prior images. and My impression is: nsr    Assessment/Plan:  Justification for Admission Status  I anticipate this patient is appropriate for observation status at this time because chest pin    * Substernal chest pain- (present on admission)  Assessment & Plan  The ASCVD Risk score ([place] DK, et al., 2019) failed to calculate for the following reasons:    Cannot find a previous HDL lab    Cannot find a previous total cholesterol lab  Chest pain while at rest, not radiating.  No more emotional stress than usual  Troponin negative cardiology consulted unremarkable  Stress test ordered    Gastroesophageal reflux disease without esophagitis- (present on admission)  Assessment & Plan  Continue omeprazole    Primary hypertension- (present on admission)  Assessment & Plan  Continue losartan, metoprolol    Alcohol use- (present on admission)  Assessment & Plan  She has 1-2 drinks a day but says she keeps in light.  Counseled on decreasing alcohol use        VTE prophylaxis: enoxaparin ppx  "

## 2025-02-12 NOTE — PROGRESS NOTES
Monitor Summary:  Rhythm: SB/SR  Rate: 56-61  Ectopy: none    0.20/0.10/0.44    Per monitor tech interpretation

## 2025-02-12 NOTE — DISCHARGE INSTRUCTIONS
FOLLOW UP ITEMS POST DISCHARGE  Please call [phone] to schedule PCP appointment for patient.    Required specialty appointments include:       Discharge Instructions per JAGRUTI JimenezPWilliamRWilliamN.    Follow-up with PCP s/p hospitalization  Continue following with cardiology as indicated  Continue all home medications  Start taking hydralazine 10 mg 3 times daily for better blood pressure control  Record your blood pressure twice daily for 1 week with all blood pressure medications on board      DIET: Cardiac    ACTIVITY: As tolerated    DIAGNOSIS: Chest pain    Return to ER if symptoms persist, chest pain, palpitations, shortness of breath, numbness, tingling, weakness, and high fevers.

## 2025-02-12 NOTE — PROGRESS NOTES
4 Eyes Skin Assessment Completed by CURT Condon and CURT Sunshine.    Head WDL  Ears WDL  Nose WDL  Mouth WDL  Neck WDL  Breast/Chest WDL  Shoulder Blades WDL  Spine WDL  (R) Arm/Elbow/Hand WDL  (L) Arm/Elbow/Hand WDL  Abdomen WDL  Groin WDL  Scrotum/Coccyx/Buttocks WDL  (R) Leg WDL  (L) Leg WDL  (R) Heel/Foot/Toe WDL  (L) Heel/Foot/Toe WDL          Devices In Places Tele Box and Pulse Ox      Interventions In Place Pillows    Possible Skin Injury No    Pictures Uploaded Into Epic N/A  Wound Consult Placed N/A  RN Wound Prevention Protocol Ordered No

## 2025-02-12 NOTE — PROGRESS NOTES
Bedside report received from Roseanna ELIAS at this time. Patient sitting up in bed talking in full sentences, no distress,  at bedside. Denies chest pain or SOB, on tele SR 60. NPO for stress test, calls approp for needs

## 2025-02-12 NOTE — DISCHARGE SUMMARY
Discharge Summary    CHIEF COMPLAINT ON ADMISSION  No chief complaint on file.      Reason for Admission  Chest pain     Admission Date  2/12/2025    CODE STATUS  Full Code    HPI & HOSPITAL COURSE    Ms. Nury Barrios is a 69 y.o. female who presented 2/12/2025 with chest pain.  PMH of hypertension, GERD.  She is complaining of chest pain in the upper abdomen/lower chest region radiating to the left side of the chest.  She has had previous symptoms in the past which were mild but never this bad before.  No history of coronary artery disease, stroke.  She has a history of hypertension and believes her blood pressure is less controlled, she does not really measure at home well without much.      On arrival afebrile.  BP significantly elevated with systolic in the 190s.  Troponin negative, EKG from outside facility unremarkable.  Patient admitted to hospital medicine for management of care.    During this hospitalization, patient was noted to have high blood pressure on admission.  A cardiac stress test was also obtained of which results noted no evidence of significant jeopardized viable myocardium or prior myocardial infarction with normal left ventricular size, ejection fraction, and wall motion.  Patient seen and examined prior to being discharged.  Discussed with patient stress test results.  Patient will also be placed on another blood pressure medication due to notable hypertensive urgency on admission.  Patient endorsed that she is currently on metoprolol and losartan and feels that these are not quite controlling her blood pressure.  Patient had taken amlodipine in the past which caused her to have bilateral lower extremities.  Will trial patient on hydralazine 10 mg 3 times daily and will keep a log of her blood pressure.  She will then discuss this with either her PCP or cardiologist into whether or not hydralazine will be discontinued or adjusted.  Other than that, patient will continue all other  home medications.  All questions and concerns answered prior to discharge.  Patient discharged home.    Therefore, she is discharged in good and stable condition to home with close outpatient follow-up.    The patient recovered much more quickly than anticipated on admission.    Discharge Date  02/12/25      FOLLOW UP ITEMS POST DISCHARGE  Please call [phone] to schedule PCP appointment for patient.    Required specialty appointments include:       Discharge Instructions per JAXSON Jimenez    Follow-up with PCP s/p hospitalization  Continue following with cardiology as indicated  Continue all home medications  Start taking hydralazine 10 mg 3 times daily for better blood pressure control  Record your blood pressure twice daily for 1 week with all blood pressure medications on board      DIET: Cardiac    ACTIVITY: As tolerated    DIAGNOSIS: Chest pain    Return to ER if symptoms persist, chest pain, palpitations, shortness of breath, numbness, tingling, weakness, and high fevers.      DISCHARGE DIAGNOSES  Principal Problem (Resolved):    Substernal chest pain (POA: Yes)  Active Problems:    Alcohol use (POA: Yes)    Primary hypertension (POA: Yes)    Gastroesophageal reflux disease without esophagitis (POA: Yes)      FOLLOW UP    ISRAEL Warner  [address] [place] [id number] [phone]    Schedule an appointment as soon as possible for a visit in 1 week(s)        MEDICATIONS ON DISCHARGE     Medication List        CONTINUE taking these medications        Instructions   MILTON-MAG-ZINC PO   Take  by mouth.     losartan 50 MG Tabs  Commonly known as: Cozaar   Take 1 Tablet by mouth 2 times a day.  Dose: 50 mg     metoprolol SR 25 MG Tb24  Commonly known as: Toprol XL   TAKE 1 TABLET BY MOUTH EVERY DAY  Dose: 25 mg     multivitamin Tabs   Take 1 Tab by mouth every day.  Dose: 1 Tablet     omeprazole 20 MG delayed-release capsule  Commonly known as: PriLOSEC   Take 1 Cap by  mouth 2 Times a Day.  Dose: 20 mg     VITAMIN C ER PO   Take  by mouth.     VITAMIN D (CHOLECALCIFEROL) PO   Take  by mouth.              Allergies  No Known Allergies    DIET  Orders Placed This Encounter   Procedures    Diet Order Diet: Cardiac     Standing Status:   Standing     Number of Occurrences:   1     Diet::   Cardiac [6]       ACTIVITY  As tolerated.  Weight bearing as tolerated    CONSULTATIONS  NONE    PROCEDURES  NONE    IMAGING  NM-CARDIAC STRESS TEST   Final Result            LABORATORY  Lab Results   Component Value Date    SODIUM 139 02/12/2025    POTASSIUM 3.8 02/12/2025    CHLORIDE 103 02/12/2025    CO2 24 02/12/2025    GLUCOSE 102 (H) 02/12/2025    BUN 6 (L) 02/12/2025    CREATININE 0.75 02/12/2025        Lab Results   Component Value Date    WBC 10.5 02/12/2025    HEMOGLOBIN 15.2 02/12/2025    HEMATOCRIT 42.5 02/12/2025    PLATELETCT 324 02/12/2025

## 2025-02-12 NOTE — HOSPITAL COURSE
Ms. Nury Barrios is a 69 y.o. female who presented 2/12/2025 with chest pain.  PMH of hypertension, GERD.  She is complaining of chest pain in the upper abdomen/lower chest region radiating to the left side of the chest.  She has had previous symptoms in the past which were mild but never this bad before.  No history of coronary artery disease, stroke.  She has a history of hypertension and believes her blood pressure is less controlled, she does not really measure at home well without much.      On arrival afebrile.  BP significantly elevated with systolic in the 190s.  Troponin negative, EKG from outside facility unremarkable.  Patient admitted to hospital medicine for management of care.    During this hospitalization, patient was noted to have high blood pressure on admission.  A cardiac stress test was also obtained of which results noted no evidence of significant jeopardized viable myocardium or prior myocardial infarction with normal left ventricular size, ejection fraction, and wall motion.  Patient seen and examined prior to being discharged.  Discussed with patient stress test results.  Patient will also be placed on another blood pressure medication due to notable hypertensive urgency on admission.  Patient endorsed that she is currently on metoprolol and losartan and feels that these are not quite controlling her blood pressure.  Patient had taken amlodipine in the past which caused her to have bilateral lower extremities.  Will trial patient on hydralazine 10 mg 3 times daily and will keep a log of her blood pressure.  She will then discuss this with either her PCP or cardiologist into whether or not hydralazine will be discontinued or adjusted.  Other than that, patient will continue all other home medications.  All questions and concerns answered prior to discharge.  Patient discharged home.

## 2025-02-12 NOTE — ASSESSMENT & PLAN NOTE
The ASCVD Risk score (Sara RODAS, et al., 2019) failed to calculate for the following reasons:    Cannot find a previous HDL lab    Cannot find a previous total cholesterol lab  Chest pain while at rest, not radiating.  No more emotional stress than usual  Troponin negative cardiology consulted unremarkable  Stress test ordered

## 2025-02-12 NOTE — PROGRESS NOTES
Patient arrived to CDU. Direct admission from [facility]. Assessment completed. Pt A&Ox4. Respirations are even and unlabored on room air. Pt denies any pain at this time. Monitors applied, VS stable, call light and belongings within reach. POC updated. Pt educated on room and call light, pt verbalized understanding. Communication board updated. Needs met. Written orders received from Dr. Goyal.

## 2025-02-13 NOTE — PROGRESS NOTES
[facility] Discussed discharge paperwork and medications with patient. Answered all questions. No home meds to return. Patient ambulated out, personal belongings in hand.

## 2025-02-21 ENCOUNTER — HOSPITAL ENCOUNTER (EMERGENCY)
Facility: MEDICAL CENTER | Age: 70
End: 2025-02-21
Attending: EMERGENCY MEDICINE
Payer: MEDICARE

## 2025-02-21 ENCOUNTER — NON-PROVIDER VISIT (OUTPATIENT)
Dept: CARDIOLOGY | Facility: MEDICAL CENTER | Age: 70
End: 2025-02-21
Attending: INTERNAL MEDICINE
Payer: MEDICARE

## 2025-02-21 ENCOUNTER — TELEPHONE (OUTPATIENT)
Dept: CARDIOLOGY | Facility: MEDICAL CENTER | Age: 70
End: 2025-02-21
Payer: MEDICARE

## 2025-02-21 VITALS
DIASTOLIC BLOOD PRESSURE: 95 MMHG | RESPIRATION RATE: 18 BRPM | HEIGHT: 63 IN | BODY MASS INDEX: 30.43 KG/M2 | WEIGHT: 171.74 LBS | SYSTOLIC BLOOD PRESSURE: 205 MMHG | TEMPERATURE: 97.7 F | OXYGEN SATURATION: 96 % | HEART RATE: 78 BPM

## 2025-02-21 VITALS — SYSTOLIC BLOOD PRESSURE: 191 MMHG | DIASTOLIC BLOOD PRESSURE: 96 MMHG

## 2025-02-21 DIAGNOSIS — I10 HYPERTENSION, UNSPECIFIED TYPE: ICD-10-CM

## 2025-02-21 PROCEDURE — 99284 EMERGENCY DEPT VISIT MOD MDM: CPT

## 2025-02-21 NOTE — TELEPHONE ENCOUNTER
Noted below:    ----- Message from PREMA PACK sent at 2/21/2025  8:14 AM PST -----  Regarding: Pt Call  Hello!    Pt was seen in the ED 02/12 for multiple issues including BP, woke up last night having the same issues as well as more BP issues. Appt w/ TW is scheduled on 03/13, asking for a call back to discuss what issues she is having to see if there is anything that can be done at this time. Or a heart monitor?    Thanks!

## 2025-02-21 NOTE — PROGRESS NOTES
/96    Phone Number Called: [phone]     Call outcome: Spoke to patient regarding message below.    Message: Called to advise pt proceed to ER at this time.

## 2025-02-21 NOTE — PROGRESS NOTES
Patient was here today for BP check. BP readings located in vital sign section. Informed patient we will forward readings to nurse and they will receive a call with recommendations.  Did patient present with home cuff? Yes  Is patient reporting any symptoms? No  If Yes, RN to visit exam room

## 2025-02-21 NOTE — ED TRIAGE NOTES
"Chief Complaint   Patient presents with    Sent by MD     Sent from cardiology. Pt states her BP spiked last night and woke her up in the middle of the night. Went to the cardiology office today, BP was in the 200s. Pt denies CP/SOB. Reports feeling \"tingly\" last night. Denies any at this time.      Pt ambulates to triage for above.     Pt denies any sx at this time. States she has been on the same BP regimen for \"a long time\". Concerned she may need her meds changed.     Pt returned to ED kennedy, educated on triage process.   "

## 2025-02-22 NOTE — DISCHARGE INSTRUCTIONS
Continue your current medication.  Follow-up with your cardiologist for further blood pressure management.

## 2025-02-22 NOTE — ED PROVIDER NOTES
"ED Provider Note    CHIEF COMPLAINT  Chief Complaint   Patient presents with    Sent by MD     Sent from cardiology. Pt states her BP spiked last night and woke her up in the middle of the night. Went to the cardiology office today, BP was in the 200s. Pt denies CP/SOB. Reports feeling \"tingly\" last night. Denies any at this time.        EXTERNAL RECORDS REVIEWED  Other I reviewed a discharge summary from 2025 when the patient was admitted for chest pain.  The patient was noted to have high blood pressure.  Stress test was performed and showed no evidence of ischemic heart disease.    HPI/ROS    Nury Barrios is a 69 y.o. female who presents with hypertension.  The patient states last night she felt like her blood pressure spiked and she checked it and it was elevated.  She contacted her cardiologist office today and they told her to come into the ER.  She states she had a tingling sensation in her chest.  She was recently admitted and ruled out from a cardiovascular standpoint had a negative stress test.  They did add hydrochlorothiazide to her regimen last week.  She brings in notes of her blood pressure over the last several days and it has been appropriate and only elevated over the last 24 hours.  She does not have a headache.  She has no evidence of bleeding.  At this time she is asymptomatic.  She does admit to anxiety.    PAST MEDICAL HISTORY   has a past medical history of Hypertension.    SURGICAL HISTORY   has a past surgical history that includes knee arthroscopy (); gastroscopy with biopsy (2012); and gastroscopy-endo (3/8/2016).    FAMILY HISTORY  History reviewed. No pertinent family history.    SOCIAL HISTORY  Social History     Tobacco Use    Smoking status: Former     Current packs/day: 0.00     Types: Cigarettes     Quit date: 3/24/1991     Years since quittin.9    Smokeless tobacco: Not on file   Substance and Sexual Activity    Alcohol use: Yes     Comment: 3 " "vodka drinks daily    Drug use: No    Sexual activity: Not on file       CURRENT MEDICATIONS  Home Medications       Reviewed by Babita Colón R.N. (Registered Nurse) on 02/21/25 at 1545  Med List Status: Partial     Medication Last Dose Status   Ascorbic Acid (VITAMIN C ER PO)  Active   Calcium-Magnesium-Zinc (MILTON-MAG-ZINC PO)  Active   hydrALAZINE (APRESOLINE) 10 MG Tab  Active   losartan (COZAAR) 50 MG Tab  Active   metoprolol SR (TOPROL XL) 25 MG TABLET SR 24 HR  Active   multivitamin (THERAGRAN) TABS  Active   omeprazole (PRILOSEC) 20 MG delayed-release capsule  Active   VITAMIN D, CHOLECALCIFEROL, PO  Active                    ALLERGIES  No Known Allergies    PHYSICAL EXAM  VITAL SIGNS: BP (!) 205/95   Pulse 86   Temp 36.4 °C (97.5 °F) (Temporal)   Resp 17   Ht 1.6 m (5' 3\")   Wt 77.9 kg (171 lb 11.8 oz)   SpO2 96%   BMI 30.42 kg/m²    In general the patient appears anxious but nontoxic    HEENT unremarkable    Pulmonary the patient's lungs are clear to auscultation bilaterally    Cardiovascular S1-S2 with a regular rate and rhythm    GI abdomen soft    Skin no rashes, pallor, no jaundice    Extremities no distal edema    Neurologic examination is grossly intact      COURSE & MEDICAL DECISION MAKING    This is 69-year-old female who presents to the emergency department with high blood pressure.  She recently had a cardiac stress test.  She presents with a log of her blood pressures that been fairly well-controlled over the last week.  She has had a bump over the last 24 hours.  The patient does not have any evidence of endorgan disease.  Therefore hold off on acute management and I like her to follow-up with her cardiologist as scheduled for further outpatient management.  Some of this could be secondary to her anxiety.    FINAL DIAGNOSIS  Hypertension    Disposition  The patient will follow-up with her primary care provider for further workup.     Electronically signed by: Brenton Baldwin M.D., " 2/21/2025 4:16 PM

## 2025-02-28 ENCOUNTER — PATIENT MESSAGE (OUTPATIENT)
Dept: CARDIOLOGY | Facility: MEDICAL CENTER | Age: 70
End: 2025-02-28
Payer: MEDICARE

## 2025-02-28 DIAGNOSIS — R00.2 PALPITATIONS: ICD-10-CM

## 2025-02-28 DIAGNOSIS — I10 ESSENTIAL HYPERTENSION: ICD-10-CM

## 2025-02-28 RX ORDER — METOPROLOL SUCCINATE 25 MG/1
25 TABLET, EXTENDED RELEASE ORAL DAILY
Qty: 90 TABLET | Refills: 0 | Status: SHIPPED | OUTPATIENT
Start: 2025-02-28

## 2025-02-28 NOTE — TELEPHONE ENCOUNTER
Is the patient due for a refill? Yes     Was the patient seen the last 15 months? Yes    Date of last office visit: 11/8/23    Does the patient have an upcoming appointment?  Yes   If yes, When? 3/13/2025    Provider to refill:TW    Does the patient have shelter Plus and need 100-day supply? (This applies to ALL medications) Patient does not have SCP

## 2025-02-28 NOTE — TELEPHONE ENCOUNTER
Courtesy refill for Metoprolol ER provided to cover patient until upcoming Cardiology follow-up. [employer] notification sent to patient.

## 2025-03-03 ENCOUNTER — PATIENT MESSAGE (OUTPATIENT)
Dept: CARDIOLOGY | Facility: MEDICAL CENTER | Age: 70
End: 2025-03-03
Payer: MEDICARE

## 2025-03-13 ENCOUNTER — OFFICE VISIT (OUTPATIENT)
Dept: CARDIOLOGY | Facility: MEDICAL CENTER | Age: 70
End: 2025-03-13
Attending: INTERNAL MEDICINE
Payer: MEDICARE

## 2025-03-13 VITALS
DIASTOLIC BLOOD PRESSURE: 73 MMHG | RESPIRATION RATE: 16 BRPM | OXYGEN SATURATION: 98 % | HEART RATE: 82 BPM | SYSTOLIC BLOOD PRESSURE: 126 MMHG | WEIGHT: 171 LBS | BODY MASS INDEX: 30.3 KG/M2 | HEIGHT: 63 IN

## 2025-03-13 DIAGNOSIS — K21.9 GASTROESOPHAGEAL REFLUX DISEASE WITHOUT ESOPHAGITIS: ICD-10-CM

## 2025-03-13 DIAGNOSIS — I10 PRIMARY HYPERTENSION: ICD-10-CM

## 2025-03-13 PROCEDURE — 99212 OFFICE O/P EST SF 10 MIN: CPT | Performed by: INTERNAL MEDICINE

## 2025-03-13 PROCEDURE — 99214 OFFICE O/P EST MOD 30 MIN: CPT | Performed by: INTERNAL MEDICINE

## 2025-03-13 PROCEDURE — 3078F DIAST BP <80 MM HG: CPT | Performed by: INTERNAL MEDICINE

## 2025-03-13 PROCEDURE — 3074F SYST BP LT 130 MM HG: CPT | Performed by: INTERNAL MEDICINE

## 2025-03-13 PROCEDURE — G2211 COMPLEX E/M VISIT ADD ON: HCPCS | Performed by: INTERNAL MEDICINE

## 2025-03-13 RX ORDER — HYDRALAZINE HYDROCHLORIDE 10 MG/1
10 TABLET, FILM COATED ORAL 3 TIMES DAILY
Qty: 90 TABLET | Refills: 11 | Status: SHIPPED | OUTPATIENT
Start: 2025-03-13 | End: 2025-03-24

## 2025-03-13 NOTE — PROGRESS NOTES
Chief Complaint   Patient presents with    Palpitations     Follow up       Subjective     Nury Barrios is a 69y.o. female who presents today for follow-up regarding palpitations and hypertension.      Since last visit she is doing better with home blood pressures that are overall very well-controlled, particularly since her recent hospital admission for hypertensive urgency last month that was evaluated with a normal stress test.  They have become better controlled as she has dramatically decreased her alcohol intake for the past 2 weeks and plans to continue making this and other healthy lifestyle choices.  She is asymptomatic.    Past Medical History:   Diagnosis Date    Hypertension      Past Surgical History:   Procedure Laterality Date    GASTROSCOPY-ENDO  3/8/2016    Procedure: GASTROSCOPY-ENDO;  Surgeon: Flynn Flanagan M.D.;  Location: ENDOSCOPY [facility];  Service:     GASTROSCOPY WITH BIOPSY  2012    Performed by DONN JJ at ENDOSCOPY [facility]    KNEE ARTHROSCOPY       No family history on file.  Social History     Socioeconomic History    Marital status:      Spouse name: Not on file    Number of children: Not on file    Years of education: Not on file    Highest education level: Not on file   Occupational History    Not on file   Tobacco Use    Smoking status: Former     Current packs/day: 0.00     Types: Cigarettes     Quit date: 3/24/1991     Years since quittin.9    Smokeless tobacco: Not on file   Substance and Sexual Activity    Alcohol use: Yes     Comment: occ    Drug use: No    Sexual activity: Not on file   Other Topics Concern    Not on file   Social History Narrative    Not on file     Social Drivers of Health     Financial Resource Strain: Not on file   Food Insecurity: No Food Insecurity (2025)    Hunger Vital Sign     Worried About Running Out of Food in the Last Year: Never true     Ran Out of Food in the Last Year: Never  true   Transportation Needs: No Transportation Needs (2/12/2025)    PRAPARE - Transportation     Lack of Transportation (Medical): No     Lack of Transportation (Non-Medical): No   Physical Activity: Not on file   Stress: Not on file   Social Connections: Not on file   Intimate Partner Violence: Not At Risk (2/12/2025)    Humiliation, Afraid, Rape, and Kick questionnaire     Fear of Current or Ex-Partner: No     Emotionally Abused: No     Physically Abused: No     Sexually Abused: No   Housing Stability: Low Risk  (2/12/2025)    Housing Stability Vital Sign     Unable to Pay for Housing in the Last Year: No     Number of Times Moved in the Last Year: 0     Homeless in the Last Year: No     No Known Allergies  Outpatient Encounter Medications as of 3/13/2025   Medication Sig Dispense Refill    Calcium-Magnesium (MILTON/MAG PO) Take  by mouth. MILTON/MAG/POTASSIUM      hydrALAZINE (APRESOLINE) 10 MG Tab Take 1 Tablet by mouth 3 times a day. 90 Tablet 11    metoprolol SR (TOPROL XL) 25 MG TABLET SR 24 HR TAKE 1 TABLET BY MOUTH EVERY DAY 90 Tablet 0    losartan (COZAAR) 50 MG Tab Take 1 Tablet by mouth 2 times a day. 180 Tablet 3    VITAMIN D, CHOLECALCIFEROL, PO Take  by mouth.      Ascorbic Acid (VITAMIN C ER PO) Take  by mouth.      omeprazole (PRILOSEC) 20 MG delayed-release capsule Take 1 Cap by mouth 2 Times a Day. 60 Cap 2    [DISCONTINUED] hydrALAZINE (APRESOLINE) 10 MG Tab Take 1 Tablet by mouth 3 times a day. 90 Tablet 0    [DISCONTINUED] metoprolol SR (TOPROL XL) 25 MG TABLET SR 24 HR TAKE 1 TABLET BY MOUTH EVERY DAY 90 Tablet 0    [DISCONTINUED] Calcium-Magnesium-Zinc (MILTON-MAG-ZINC PO) Take  by mouth. (Patient not taking: Reported on 3/13/2025)      [DISCONTINUED] multivitamin (THERAGRAN) TABS Take 1 Tab by mouth every day. 30 Each 3     No facility-administered encounter medications on file as of 3/13/2025.     Review of Systems   All other systems reviewed and are negative.             Objective     /73  "(BP Location: Left arm, Patient Position: Sitting)   Pulse 82   Resp 16   Ht 1.6 m (5' 3\")   Wt 77.6 kg (171 lb)   SpO2 98%   BMI 30.29 kg/m²     Physical Exam  Vitals and nursing note reviewed.   Constitutional:       General: She is not in acute distress.     Appearance: Normal appearance.   HENT:      Head: Normocephalic and atraumatic.      Right Ear: External ear normal.      Left Ear: External ear normal.      Nose: Nose normal.   Eyes:      Conjunctiva/sclera: Conjunctivae normal.   Cardiovascular:      Rate and Rhythm: Normal rate and regular rhythm.      Pulses: Normal pulses.      Heart sounds: Murmur heard.   Pulmonary:      Effort: Pulmonary effort is normal. No respiratory distress.      Breath sounds: Normal breath sounds.   Abdominal:      General: There is no distension.      Palpations: Abdomen is soft.   Musculoskeletal:      Cervical back: No rigidity or tenderness.      Right lower leg: No edema.      Left lower leg: No edema.   Skin:     General: Skin is warm and dry.      Capillary Refill: Capillary refill takes 2 to 3 seconds.   Neurological:      General: No focal deficit present.      Mental Status: She is alert and oriented to person, place, and time.   Psychiatric:         Mood and Affect: Mood normal.         Behavior: Behavior normal.         Thought Content: Thought content normal.       LABS:  No results found for: \"CHOLSTRLTOT\", \"LDL\", \"HDL\", \"TRIGLYCERIDE\"    Lab Results   Component Value Date/Time    WBC 10.5 02/12/2025 02:05 AM    RBC 4.42 02/12/2025 02:05 AM    HEMOGLOBIN 15.2 02/12/2025 02:05 AM    HEMATOCRIT 42.5 02/12/2025 02:05 AM    MCV 96.2 02/12/2025 02:05 AM    NEUTSPOLYS 83.30 (H) 01/22/2023 09:10 AM    LYMPHOCYTES 10.60 (L) 01/22/2023 09:10 AM    MONOCYTES 4.90 01/22/2023 09:10 AM    EOSINOPHILS 0.50 01/22/2023 09:10 AM    BASOPHILS 0.20 01/22/2023 09:10 AM     Lab Results   Component Value Date/Time    SODIUM 139 02/12/2025 07:48 AM    POTASSIUM 3.8 02/12/2025 " "07:48 AM    CHLORIDE 103 02/12/2025 07:48 AM    CO2 24 02/12/2025 07:48 AM    GLUCOSE 102 (H) 02/12/2025 07:48 AM    BUN 6 (L) 02/12/2025 07:48 AM    CREATININE 0.75 02/12/2025 07:48 AM         Lab Results   Component Value Date/Time    ALKPHOSPHAT 111 (H) 01/22/2023 09:10 AM    ASTSGOT 27 01/22/2023 09:10 AM    ALTSGPT 24 01/22/2023 09:10 AM    TBILIRUBIN 0.7 01/22/2023 09:10 AM      No results found for: \"BNPBTYPENAT\"   No results found for: \"TSH\"  Lab Results   Component Value Date/Time    PROTHROMBTM 12.5 03/07/2016 04:43 PM    INR 0.93 03/07/2016 04:43 PM        EKG reviewed 6/5/2023             Assessment & Plan     1. Primary hypertension  hydrALAZINE (APRESOLINE) 10 MG Tab      2. Gastroesophageal reflux disease without esophagitis            Medical Decision Making: Today's Assessment/Status/Plan:          Hypertensive urgency with better blood pressure control after reducing alcohol intake and medication adjustments.  We discussed hydralazine is cumbersome medication to take due to the dosing schedule but it is effective and she would like to continue it I will refill.  We discussed evaluation for secondary causes of hypertension particular renal artery stenosis and consideration of evaluation for candidacy of renal denervation.  She would like to think about this.  She is already addressing the main assessment of alcohol use and this is improving things.  She can follow-up routinely.    () Today's E/M visit is associated with medical care services that serve as the continuing focal point for all needed health care services and/or with medical care services that  are part of ongoing care related to a patient's single, serious condition, or a complex condition: This includes  furnishing services to patients on an ongoing basis that result in care that is personalized  to the patient. The services result in a comprehensive, longitudinal, and continuous  relationship with the patient and involve delivery " of team-based care that is accessible, coordinated with other practitioners and providers, and integrated with the broader health  care landscape.

## 2025-03-24 ENCOUNTER — PATIENT MESSAGE (OUTPATIENT)
Dept: CARDIOLOGY | Facility: MEDICAL CENTER | Age: 70
End: 2025-03-24
Payer: MEDICARE

## 2025-03-24 DIAGNOSIS — I10 PRIMARY HYPERTENSION: ICD-10-CM

## 2025-03-24 RX ORDER — HYDRALAZINE HYDROCHLORIDE 10 MG/1
10 TABLET, FILM COATED ORAL 3 TIMES DAILY
Qty: 270 TABLET | Refills: 3 | Status: SHIPPED | OUTPATIENT
Start: 2025-03-24

## 2025-05-19 ENCOUNTER — PATIENT MESSAGE (OUTPATIENT)
Dept: CARDIOLOGY | Facility: MEDICAL CENTER | Age: 70
End: 2025-05-19
Payer: MEDICARE

## 2025-05-20 ENCOUNTER — PATIENT MESSAGE (OUTPATIENT)
Dept: CARDIOLOGY | Facility: MEDICAL CENTER | Age: 70
End: 2025-05-20
Payer: MEDICARE

## 2025-05-20 ENCOUNTER — TELEPHONE (OUTPATIENT)
Dept: CARDIOLOGY | Facility: MEDICAL CENTER | Age: 70
End: 2025-05-20
Payer: MEDICARE

## 2025-05-20 NOTE — TELEPHONE ENCOUNTER
TW    Caller: Nury Barrios     Topic/issue: Patient is scheduled for 6/4/25 and is asking TW to take a look at the imaging orders from [facility]. Patient states that TF wants her to have an EEG and a zio patch.    Please advise.    Callback Number: [phone]     Thank you,  Trish DESAI

## 2025-05-21 ENCOUNTER — PATIENT MESSAGE (OUTPATIENT)
Dept: CARDIOLOGY | Facility: MEDICAL CENTER | Age: 70
End: 2025-05-21
Payer: MEDICARE

## 2025-06-04 ENCOUNTER — APPOINTMENT (OUTPATIENT)
Dept: CARDIOLOGY | Facility: MEDICAL CENTER | Age: 70
End: 2025-06-04
Attending: INTERNAL MEDICINE
Payer: MEDICARE

## 2025-06-04 ENCOUNTER — PATIENT MESSAGE (OUTPATIENT)
Dept: CARDIOLOGY | Facility: MEDICAL CENTER | Age: 70
End: 2025-06-04
Payer: MEDICARE

## 2025-06-04 DIAGNOSIS — I10 ESSENTIAL HYPERTENSION: ICD-10-CM

## 2025-06-04 DIAGNOSIS — R00.2 PALPITATIONS: ICD-10-CM

## 2025-06-04 RX ORDER — METOPROLOL SUCCINATE 25 MG/1
25 TABLET, EXTENDED RELEASE ORAL DAILY
Qty: 90 TABLET | Refills: 0 | Status: SHIPPED | OUTPATIENT
Start: 2025-06-04

## 2025-06-04 NOTE — TELEPHONE ENCOUNTER
Is the patient due for a refill? Yes    Was the patient seen the last 15 months? Yes    Date of last office visit: 3/13/25    Does the patient have an upcoming appointment?  Yes   If yes, When? 6/13/25    Provider to refill:TW    Does the patient have residential Plus and need 100-day supply? (This applies to ALL medications) Patient does not have SCP

## 2025-06-13 ENCOUNTER — OFFICE VISIT (OUTPATIENT)
Dept: CARDIOLOGY | Facility: MEDICAL CENTER | Age: 70
End: 2025-06-13
Attending: INTERNAL MEDICINE
Payer: MEDICARE

## 2025-06-13 VITALS
DIASTOLIC BLOOD PRESSURE: 78 MMHG | SYSTOLIC BLOOD PRESSURE: 124 MMHG | HEIGHT: 63 IN | HEART RATE: 86 BPM | BODY MASS INDEX: 29.77 KG/M2 | OXYGEN SATURATION: 94 % | RESPIRATION RATE: 18 BRPM | WEIGHT: 168 LBS

## 2025-06-13 DIAGNOSIS — E87.6 HYPOKALEMIA: ICD-10-CM

## 2025-06-13 DIAGNOSIS — I10 ESSENTIAL HYPERTENSION: ICD-10-CM

## 2025-06-13 DIAGNOSIS — R40.20 LOSS OF CONSCIOUSNESS (HCC): Primary | ICD-10-CM

## 2025-06-13 DIAGNOSIS — F10.90 ALCOHOL USE: ICD-10-CM

## 2025-06-13 DIAGNOSIS — E86.0 DEHYDRATION: ICD-10-CM

## 2025-06-13 DIAGNOSIS — R00.2 PALPITATIONS: ICD-10-CM

## 2025-06-13 PROCEDURE — 3074F SYST BP LT 130 MM HG: CPT | Performed by: INTERNAL MEDICINE

## 2025-06-13 PROCEDURE — 99213 OFFICE O/P EST LOW 20 MIN: CPT | Performed by: INTERNAL MEDICINE

## 2025-06-13 PROCEDURE — 3078F DIAST BP <80 MM HG: CPT | Performed by: INTERNAL MEDICINE

## 2025-06-13 PROCEDURE — 99214 OFFICE O/P EST MOD 30 MIN: CPT | Performed by: INTERNAL MEDICINE

## 2025-06-13 NOTE — PROGRESS NOTES
Chief Complaint   Patient presents with    Palpitations     Follow up        Subjective     Nury Barrios is a 69 y.o. female who presents today for follow-up regarding palpitations and hypertension.      Since last visit she returns having had a syncopal event after attending a concert where she drink alcohol that evening waking up taking her pills on an empty stomach and proceeding to drink at least a couple of mimosas at breakfast.  She was noted to be difficult to arouse and diaphoretic.  She was hospitalized at an outside facility records reviewed revealed hypokalemia hypomagnesemia hyponatremia and she was discharged with a diagnosis of dehydration related syncope versus seizure disorder.  Cardiac evaluation was unremarkable.  She is feeling improved.    Past Medical History:   Diagnosis Date    Hypertension      Past Surgical History:   Procedure Laterality Date    GASTROSCOPY-ENDO  3/8/2016    Procedure: GASTROSCOPY-ENDO;  Surgeon: Flynn Flanagan M.D.;  Location: ENDOSCOPY [facility] [place];  Service:     GASTROSCOPY WITH BIOPSY  2012    Performed by DONN JJ at ENDOSCOPY [facility] [place]    KNEE ARTHROSCOPY  1984     History reviewed. No pertinent family history.  Social History     Socioeconomic History    Marital status:      Spouse name: Not on file    Number of children: Not on file    Years of education: Not on file    Highest education level: Not on file   Occupational History    Not on file   Tobacco Use    Smoking status: Former     Current packs/day: 0.00     Types: Cigarettes     Quit date: 3/24/1991     Years since quittin.2    Smokeless tobacco: Not on file   Substance and Sexual Activity    Alcohol use: Yes     Comment: occ    Drug use: No    Sexual activity: Not on file   Other Topics Concern    Not on file   Social History Narrative    Not on file     Social Drivers of Health     Financial Resource Strain: Not on file   Food Insecurity: Not on file  "(5/10/2025)   Transportation Needs: No Transportation Needs (5/10/2025)    Received from [facility] (and [facility] prior to 7/1/2021)    Transportation Needs     Patient needs follow up regarding:: 1   Physical Activity: Not on file   Stress: Not on file   Social Connections: Not on file   Intimate Partner Violence: Not At Risk (5/10/2025)    Received from [facility] (and [facility] prior to 7/1/2021)    Feeling Safe      Are you in a relationship with someone who hurts you emotionally and/or physically?: No   Housing Stability: Low Risk  (2/12/2025)    Housing Stability Vital Sign     Unable to Pay for Housing in the Last Year: No     Number of Times Moved in the Last Year: 0     Homeless in the Last Year: No     No Known Allergies  Outpatient Encounter Medications as of 6/13/2025   Medication Sig Dispense Refill    metoprolol SR (TOPROL XL) 25 MG TABLET SR 24 HR TAKE 1 TABLET BY MOUTH EVERY DAY (Patient taking differently: Take 12.5 mg by mouth every day.) 90 Tablet 0    Calcium-Magnesium (MILTON/MAG PO) Take  by mouth. MILTON/MAG/POTASSIUM      losartan (COZAAR) 50 MG Tab Take 1 Tablet by mouth 2 times a day. 180 Tablet 3    VITAMIN D, CHOLECALCIFEROL, PO Take  by mouth.      Ascorbic Acid (VITAMIN C ER PO) Take  by mouth.      omeprazole (PRILOSEC) 20 MG delayed-release capsule Take 1 Cap by mouth 2 Times a Day. (Patient taking differently: Take 20 mg by mouth every day.) 60 Cap 2    [DISCONTINUED] hydrALAZINE (APRESOLINE) 10 MG Tab Take 1 Tablet by mouth 3 times a day. (Patient not taking: Reported on 6/13/2025) 270 Tablet 3     No facility-administered encounter medications on file as of 6/13/2025.     Review of Systems   All other systems reviewed and are negative.             Objective     /78 (BP Location: Left arm, Patient Position: Sitting)   Pulse 86   Resp 18   Ht 1.6 m (5' 3\")   Wt 76.2 kg (168 lb)   SpO2 " "94%   BMI 29.76 kg/m²     Physical Exam  Vitals and nursing note reviewed.   Constitutional:       General: She is not in acute distress.     Appearance: Normal appearance.   HENT:      Head: Normocephalic and atraumatic.      Right Ear: External ear normal.      Left Ear: External ear normal.      Nose: Nose normal.   Eyes:      Conjunctiva/sclera: Conjunctivae normal.   Cardiovascular:      Rate and Rhythm: Normal rate and regular rhythm.      Pulses: Normal pulses.      Heart sounds: Murmur heard.   Pulmonary:      Effort: Pulmonary effort is normal. No respiratory distress.      Breath sounds: Normal breath sounds.   Abdominal:      General: There is no distension.      Palpations: Abdomen is soft.   Musculoskeletal:      Cervical back: No rigidity or tenderness.      Right lower leg: No edema.      Left lower leg: No edema.   Skin:     General: Skin is warm and dry.      Capillary Refill: Capillary refill takes 2 to 3 seconds.   Neurological:      General: No focal deficit present.      Mental Status: She is alert and oriented to person, place, and time.   Psychiatric:         Mood and Affect: Mood normal.         Behavior: Behavior normal.         Thought Content: Thought content normal.       LABS:  No results found for: \"CHOLSTRLTOT\", \"LDL\", \"HDL\", \"TRIGLYCERIDE\"    Lab Results   Component Value Date/Time    WBC 8.7 05/11/2025 05:09 AM    WBC 10.5 02/12/2025 02:05 AM    RBC 4.35 05/11/2025 05:09 AM    RBC 4.42 02/12/2025 02:05 AM    HEMOGLOBIN 14.3 05/11/2025 05:09 AM    HEMOGLOBIN 15.2 02/12/2025 02:05 AM    HEMATOCRIT 41.1 05/11/2025 05:09 AM    HEMATOCRIT 42.5 02/12/2025 02:05 AM    MCV 94.5 05/11/2025 05:09 AM    MCV 96.2 02/12/2025 02:05 AM    NEUTSPOLYS 72 05/11/2025 05:09 AM    NEUTSPOLYS 83.30 (H) 01/22/2023 09:10 AM    LYMPHOCYTES 18 (L) 05/11/2025 05:09 AM    LYMPHOCYTES 10.60 (L) 01/22/2023 09:10 AM    MONOCYTES 7 05/11/2025 05:09 AM    MONOCYTES 4.90 01/22/2023 09:10 AM    EOSINOPHILS 4 " "05/11/2025 05:09 AM    EOSINOPHILS 0.50 01/22/2023 09:10 AM    BASOPHILS 0 05/11/2025 05:09 AM    BASOPHILS 0.20 01/22/2023 09:10 AM     Lab Results   Component Value Date/Time    SODIUM 139 02/12/2025 07:48 AM    POTASSIUM 3.8 02/12/2025 07:48 AM    CHLORIDE 103 02/12/2025 07:48 AM    CO2 24 02/12/2025 07:48 AM    GLUCOSE 102 (H) 02/12/2025 07:48 AM    BUN 6 (L) 02/12/2025 07:48 AM    CREATININE 0.75 02/12/2025 07:48 AM         Lab Results   Component Value Date/Time    ALKPHOSPHAT 111 (H) 01/22/2023 09:10 AM    ASTSGOT 27 01/22/2023 09:10 AM    ALTSGPT 24 01/22/2023 09:10 AM    TBILIRUBIN 0.7 01/22/2023 09:10 AM      No results found for: \"BNPBTYPENAT\"   No results found for: \"TSH\"  Lab Results   Component Value Date/Time    PROTHROMBTM 12.5 03/07/2016 04:43 PM    INR 0.93 03/07/2016 04:43 PM        EKG reviewed 6/5/2023             Assessment & Plan     1. Loss of consciousness (HCC)  RIH ZIO PATCH MONITOR      2. Essential hypertension        3. Palpitations        4. Alcohol use        5. Dehydration        6. Hypokalemia            Medical Decision Making: Today's Assessment/Status/Plan:          Blood pressures are better controlled.  She will continue to monitor at home.  Dehydration alcohol use and fatigue with subsequent difficult arousal during the car ride by her  tremor noncardiac in etiology.  She was recommended for Zio patch which is a reasonable conclusion to her evaluation.  She has an upcoming visit with a neurologist.  She can follow-up with cardiology yearly and as needed.        "

## 2025-06-23 ENCOUNTER — NON-PROVIDER VISIT (OUTPATIENT)
Dept: CARDIOLOGY | Facility: MEDICAL CENTER | Age: 70
End: 2025-06-23
Attending: INTERNAL MEDICINE
Payer: MEDICARE

## 2025-06-23 DIAGNOSIS — R40.20 LOSS OF CONSCIOUSNESS (HCC): ICD-10-CM

## 2025-06-23 NOTE — PROGRESS NOTES
Home enrollment completed in the 14 day Zio Holter monitor per  Chin Nicole MD.  Monitor will be shipped to patient via [employer]  Pending EOS.

## 2025-08-06 DIAGNOSIS — I10 ESSENTIAL HYPERTENSION: ICD-10-CM

## 2025-08-06 RX ORDER — LOSARTAN POTASSIUM 50 MG/1
50 TABLET ORAL 2 TIMES DAILY
Qty: 180 TABLET | Refills: 3 | Status: SHIPPED | OUTPATIENT
Start: 2025-08-06